# Patient Record
Sex: FEMALE | Race: WHITE | NOT HISPANIC OR LATINO | Employment: FULL TIME | URBAN - METROPOLITAN AREA
[De-identification: names, ages, dates, MRNs, and addresses within clinical notes are randomized per-mention and may not be internally consistent; named-entity substitution may affect disease eponyms.]

---

## 2023-04-06 LAB
ALANINE AMINOTRANSFERASE (SGPT) (U/L) IN SER/PLAS: 21 U/L (ref 7–45)
ALBUMIN (G/DL) IN SER/PLAS: 4.4 G/DL (ref 3.4–5)
ALKALINE PHOSPHATASE (U/L) IN SER/PLAS: 72 U/L (ref 33–110)
ANION GAP IN SER/PLAS: 14 MMOL/L (ref 10–20)
ASPARTATE AMINOTRANSFERASE (SGOT) (U/L) IN SER/PLAS: 20 U/L (ref 9–39)
BILIRUBIN TOTAL (MG/DL) IN SER/PLAS: 0.6 MG/DL (ref 0–1.2)
CALCIDIOL (25 OH VITAMIN D3) (NG/ML) IN SER/PLAS: 13 NG/ML
CALCIUM (MG/DL) IN SER/PLAS: 9.5 MG/DL (ref 8.6–10.6)
CARBON DIOXIDE, TOTAL (MMOL/L) IN SER/PLAS: 25 MMOL/L (ref 21–32)
CHLORIDE (MMOL/L) IN SER/PLAS: 108 MMOL/L (ref 98–107)
CHOLESTEROL (MG/DL) IN SER/PLAS: 188 MG/DL (ref 0–199)
CHOLESTEROL IN HDL (MG/DL) IN SER/PLAS: 49.9 MG/DL
CHOLESTEROL/HDL RATIO: 3.8
CREATININE (MG/DL) IN SER/PLAS: 0.98 MG/DL (ref 0.5–1.05)
ERYTHROCYTE DISTRIBUTION WIDTH (RATIO) BY AUTOMATED COUNT: 14.8 % (ref 11.5–14.5)
ERYTHROCYTE MEAN CORPUSCULAR HEMOGLOBIN CONCENTRATION (G/DL) BY AUTOMATED: 33.4 G/DL (ref 32–36)
ERYTHROCYTE MEAN CORPUSCULAR VOLUME (FL) BY AUTOMATED COUNT: 90 FL (ref 80–100)
ERYTHROCYTES (10*6/UL) IN BLOOD BY AUTOMATED COUNT: 4.56 X10E12/L (ref 4–5.2)
ESTIMATED AVERAGE GLUCOSE FOR HBA1C: 100 MG/DL
GFR FEMALE: 83 ML/MIN/1.73M2
GLUCOSE (MG/DL) IN SER/PLAS: 88 MG/DL (ref 74–99)
HEMATOCRIT (%) IN BLOOD BY AUTOMATED COUNT: 41 % (ref 36–46)
HEMOGLOBIN (G/DL) IN BLOOD: 13.7 G/DL (ref 12–16)
HEMOGLOBIN A1C/HEMOGLOBIN TOTAL IN BLOOD: 5.1 %
LDL: 116 MG/DL (ref 0–119)
LEUKOCYTES (10*3/UL) IN BLOOD BY AUTOMATED COUNT: 5.1 X10E9/L (ref 4.4–11.3)
NRBC (PER 100 WBCS) BY AUTOMATED COUNT: 0 /100 WBC (ref 0–0)
PLATELETS (10*3/UL) IN BLOOD AUTOMATED COUNT: 248 X10E9/L (ref 150–450)
POTASSIUM (MMOL/L) IN SER/PLAS: 4.6 MMOL/L (ref 3.5–5.3)
PROTEIN TOTAL: 7 G/DL (ref 6.4–8.2)
SODIUM (MMOL/L) IN SER/PLAS: 142 MMOL/L (ref 136–145)
THYROTROPIN (MIU/L) IN SER/PLAS BY DETECTION LIMIT <= 0.05 MIU/L: 1.13 MIU/L (ref 0.44–3.98)
TRIGLYCERIDE (MG/DL) IN SER/PLAS: 110 MG/DL (ref 0–149)
UREA NITROGEN (MG/DL) IN SER/PLAS: 14 MG/DL (ref 6–23)
VLDL: 22 MG/DL (ref 0–40)

## 2023-04-11 LAB
6-ACETYLMORPHINE: <25 NG/ML
7-AMINOCLONAZEPAM: <25 NG/ML
ALPHA-HYDROXYALPRAZOLAM: <25 NG/ML
ALPHA-HYDROXYMIDAZOLAM: <25 NG/ML
ALPRAZOLAM: <25 NG/ML
AMPHETAMINE (PRESENCE) IN URINE BY SCREEN METHOD: NORMAL
BARBITURATES PRESENCE IN URINE BY SCREEN METHOD: NORMAL
CANNABINOIDS IN URINE BY SCREEN METHOD: NORMAL
CHLORDIAZEPOXIDE: <25 NG/ML
CLONAZEPAM: <25 NG/ML
COCAINE (PRESENCE) IN URINE BY SCREEN METHOD: NORMAL
CODEINE: <50 NG/ML
CREATINE, URINE FOR DRUG: 347.1 MG/DL
DIAZEPAM: <25 NG/ML
DRUG SCREEN COMMENT URINE: NORMAL
EDDP: <25 NG/ML
FENTANYL CONFIRMATION, URINE: <2.5 NG/ML
HYDROCODONE: <25 NG/ML
HYDROMORPHONE: <25 NG/ML
LORAZEPAM: <25 NG/ML
METHADONE CONFIRMATION,URINE: <25 NG/ML
MIDAZOLAM: <25 NG/ML
MORPHINE URINE: <50 NG/ML
NORDIAZEPAM: <25 NG/ML
NORFENTANYL: <2.5 NG/ML
NORHYDROCODONE: <25 NG/ML
NOROXYCODONE: <25 NG/ML
O-DESMETHYLTRAMADOL: <50 NG/ML
OXAZEPAM: <25 NG/ML
OXYCODONE: <25 NG/ML
OXYMORPHONE: <25 NG/ML
PHENCYCLIDINE (PRESENCE) IN URINE BY SCREEN METHOD: NORMAL
TEMAZEPAM: <25 NG/ML
TRAMADOL: <50 NG/ML
ZOLPIDEM METABOLITE (ZCA): <25 NG/ML
ZOLPIDEM: <25 NG/ML

## 2023-09-20 PROBLEM — O13.9 PREGNANCY INDUCED HYPERTENSION (HHS-HCC): Status: ACTIVE | Noted: 2023-09-20

## 2023-09-20 PROBLEM — O99.210 OBESITY AFFECTING PREGNANCY, ANTEPARTUM (HHS-HCC): Status: ACTIVE | Noted: 2023-09-20

## 2023-09-20 PROBLEM — Z91.89 AT RISK FOR BLEEDING: Status: ACTIVE | Noted: 2023-09-20

## 2023-09-20 PROBLEM — N92.0 EXCESSIVE OR FREQUENT MENSTRUATION: Status: ACTIVE | Noted: 2023-09-20

## 2023-09-20 PROBLEM — N97.0 ANOVULATION: Status: ACTIVE | Noted: 2023-09-20

## 2023-09-20 PROBLEM — N94.6 DYSMENORRHEA: Status: ACTIVE | Noted: 2023-09-20

## 2023-09-20 PROBLEM — N94.10 DYSPAREUNIA IN FEMALE: Status: ACTIVE | Noted: 2023-09-20

## 2023-09-20 PROBLEM — R07.9 CHEST PAIN: Status: ACTIVE | Noted: 2023-09-20

## 2023-09-20 PROBLEM — N92.1 BREAKTHROUGH BLEEDING: Status: ACTIVE | Noted: 2023-09-20

## 2023-09-20 PROBLEM — O13.3 GESTATIONAL HYPERTENSION, THIRD TRIMESTER (HHS-HCC): Status: ACTIVE | Noted: 2023-09-20

## 2023-09-20 PROBLEM — N91.2 ABSENCE OF MENSTRUATION: Status: ACTIVE | Noted: 2023-09-20

## 2023-09-20 PROBLEM — N92.6 IRREGULAR MENSTRUAL CYCLE: Status: ACTIVE | Noted: 2023-09-20

## 2023-09-20 PROBLEM — K20.0 EOSINOPHILIC ESOPHAGITIS: Status: ACTIVE | Noted: 2023-09-20

## 2023-09-20 PROBLEM — D69.6 THROMBOCYTOPENIA, UNSPECIFIED (CMS-HCC): Status: ACTIVE | Noted: 2023-09-20

## 2023-09-20 RX ORDER — VENLAFAXINE HYDROCHLORIDE 150 MG/1
150 CAPSULE, EXTENDED RELEASE ORAL DAILY
COMMUNITY
End: 2023-10-24 | Stop reason: WASHOUT

## 2023-09-20 RX ORDER — FAMOTIDINE 20 MG/1
20 TABLET, FILM COATED ORAL 2 TIMES DAILY
COMMUNITY
End: 2023-10-24 | Stop reason: WASHOUT

## 2023-09-20 RX ORDER — OMEPRAZOLE 40 MG/1
40 CAPSULE, DELAYED RELEASE ORAL
COMMUNITY
Start: 2014-11-03 | End: 2023-10-24 | Stop reason: WASHOUT

## 2023-09-20 RX ORDER — CETIRIZINE HYDROCHLORIDE 10 MG/1
1 TABLET ORAL DAILY
COMMUNITY

## 2023-09-28 ENCOUNTER — HOSPITAL ENCOUNTER (OUTPATIENT)
Dept: DATA CONVERSION | Facility: HOSPITAL | Age: 23
Discharge: HOME | End: 2023-09-28
Payer: COMMERCIAL

## 2023-09-28 DIAGNOSIS — Z34.81 ENCOUNTER FOR SUPERVISION OF OTHER NORMAL PREGNANCY, FIRST TRIMESTER (HHS-HCC): ICD-10-CM

## 2023-09-28 LAB
DEPRECATED RDW RBC AUTO: 47.1 FL (ref 37–54)
ERYTHROCYTE [DISTWIDTH] IN BLOOD BY AUTOMATED COUNT: 13.9 % (ref 11.7–15)
GLUCOSE 1H P MEAL SERPL-MCNC: 143 MG/DL (ref 70–139)
HCT VFR BLD AUTO: 37.6 % (ref 36–44)
HGB BLD-MCNC: 11.8 GM/DL (ref 12–15)
MCH RBC QN AUTO: 29 PG (ref 26–34)
MCHC RBC AUTO-ENTMCNC: 31.4 % (ref 31–37)
MCV RBC AUTO: 92.4 FL (ref 80–100)
NRBC BLD-RTO: 0 /100 WBC
PLATELET # BLD AUTO: 167 K/UL (ref 150–450)
PMV BLD AUTO: 12.5 CU (ref 7–12.6)
RBC # BLD AUTO: 4.07 M/UL (ref 4–4.9)
WBC # BLD AUTO: 10.4 K/UL (ref 4.5–11)

## 2023-10-17 ENCOUNTER — LAB (OUTPATIENT)
Dept: LAB | Facility: LAB | Age: 23
End: 2023-10-17
Payer: COMMERCIAL

## 2023-10-17 DIAGNOSIS — O99.810 ABNORMAL GLUCOSE COMPLICATING PREGNANCY (HHS-HCC): Primary | ICD-10-CM

## 2023-10-17 PROCEDURE — 82950 GLUCOSE TEST: CPT | Mod: CMCLAB,WESLAB | Performed by: OBSTETRICS & GYNECOLOGY

## 2023-10-17 PROCEDURE — 82947 ASSAY GLUCOSE BLOOD QUANT: CPT | Mod: CMCLAB,WESLAB | Performed by: OBSTETRICS & GYNECOLOGY

## 2023-10-17 PROCEDURE — 36415 COLL VENOUS BLD VENIPUNCTURE: CPT

## 2023-10-18 LAB
GLUCOSE 1H P 100 G GLC PO SERPL-MCNC: 191 MG/DL
GLUCOSE 2H P 100 G GLC PO SERPL-MCNC: 125 MG/DL
GLUCOSE P FAST SERPL-MCNC: 92 MG/DL

## 2023-10-23 NOTE — PROGRESS NOTES
Subjective   Patient ID [id number]   Margot Nichols is a 22 y.o. No obstetric history on file. at Unknown with a working estimated date of delivery of Not found. who presents for a routine prenatal visit. She denies vaginal bleeding, leakage of fluid, decreased fetal movements, or contractions.    Her pregnancy is complicated by:  Abnormal 1 hour    Objective   Physical Exam:   Weight: 122 kg (268 lb)  Expected Total Weight Gain: Could not be calculated   Pregravid BMI: Could not be calculated  BP: 120/78  Fetal Heart Rate: 155 Fundal Height (cm): 30 cm Presentation: Vertex           Prenatal Labs  Urine Dip  Results for orders placed or performed in visit on 10/24/23   POCT UA Automated manually resulted   Result Value Ref Range    POC Color, Urine Yellow Straw, Yellow, Light-Yellow    POC Appearance, Urine Clear Clear    POC Specific Gravity, Urine 1.025 1.005 - 1.035    POC PH, Urine 7.0 No Reference Range Established PH    POC Protein, Urine TRACE (A) NEGATIVE, 30 (1+) mg/dl    POC Glucose, Urine NEGATIVE NEGATIVE mg/dl    POC Blood, Urine NEGATIVE NEGATIVE    POC Ketones, Urine NEGATIVE NEGATIVE mg/dl    POC Bilirubin, Urine NEGATIVE NEGATIVE    POC Urobilinogen, Urine 0.2 0.2, 1.0 EU/DL    Poc Nitrate, Urine NEGATIVE NEGATIVE    POC Leukocytes, Urine NEGATIVE NEGATIVE         Imaging  Scheduled 11/3/2023    Assessment/Plan   Diagnoses and all orders for this visit:  Prenatal care, subsequent pregnancy, third trimester  -     POCT UA Automated manually resulted  -     Tdap vaccine, age 7 years and older  28 weeks gestation of pregnancy    Continue prenatal vitamin.  Labs reviewed.  TDAP GIVEN  Follow up in 1 week for a routine prenatal visit.

## 2023-10-24 ENCOUNTER — ROUTINE PRENATAL (OUTPATIENT)
Dept: OBSTETRICS AND GYNECOLOGY | Facility: CLINIC | Age: 23
End: 2023-10-24
Payer: COMMERCIAL

## 2023-10-24 VITALS — SYSTOLIC BLOOD PRESSURE: 120 MMHG | DIASTOLIC BLOOD PRESSURE: 78 MMHG | WEIGHT: 268 LBS | BODY MASS INDEX: 44.6 KG/M2

## 2023-10-24 DIAGNOSIS — Z3A.28 28 WEEKS GESTATION OF PREGNANCY (HHS-HCC): ICD-10-CM

## 2023-10-24 DIAGNOSIS — Z34.83 PRENATAL CARE, SUBSEQUENT PREGNANCY, THIRD TRIMESTER (HHS-HCC): Primary | ICD-10-CM

## 2023-10-24 LAB
POC APPEARANCE, URINE: CLEAR
POC BILIRUBIN, URINE: NEGATIVE
POC BLOOD, URINE: NEGATIVE
POC COLOR, URINE: YELLOW
POC GLUCOSE, URINE: NEGATIVE MG/DL
POC KETONES, URINE: NEGATIVE MG/DL
POC LEUKOCYTES, URINE: NEGATIVE
POC NITRITE,URINE: NEGATIVE
POC PH, URINE: 7 PH
POC PROTEIN, URINE: ABNORMAL MG/DL
POC SPECIFIC GRAVITY, URINE: 1.02
POC UROBILINOGEN, URINE: 0.2 EU/DL

## 2023-10-24 PROCEDURE — 81003 URINALYSIS AUTO W/O SCOPE: CPT | Mod: QW | Performed by: OBSTETRICS & GYNECOLOGY

## 2023-10-24 PROCEDURE — 0501F PRENATAL FLOW SHEET: CPT | Performed by: OBSTETRICS & GYNECOLOGY

## 2023-10-24 PROCEDURE — 90471 IMMUNIZATION ADMIN: CPT | Performed by: OBSTETRICS & GYNECOLOGY

## 2023-10-24 PROCEDURE — 99213 OFFICE O/P EST LOW 20 MIN: CPT | Performed by: OBSTETRICS & GYNECOLOGY

## 2023-10-24 PROCEDURE — 90715 TDAP VACCINE 7 YRS/> IM: CPT | Performed by: OBSTETRICS & GYNECOLOGY

## 2023-10-24 RX ORDER — BUPROPION HYDROCHLORIDE 300 MG/1
300 TABLET ORAL EVERY MORNING
COMMUNITY
Start: 2023-08-14

## 2023-10-24 RX ORDER — PANTOPRAZOLE SODIUM 40 MG/1
40 TABLET, DELAYED RELEASE ORAL
COMMUNITY
End: 2023-12-04

## 2023-10-24 ASSESSMENT — LIFESTYLE VARIABLES
AUDIT-C TOTAL SCORE: 0
HOW OFTEN DO YOU HAVE SIX OR MORE DRINKS ON ONE OCCASION: NEVER
SKIP TO QUESTIONS 9-10: 1
HOW MANY STANDARD DRINKS CONTAINING ALCOHOL DO YOU HAVE ON A TYPICAL DAY: PATIENT DOES NOT DRINK
HOW OFTEN DO YOU HAVE A DRINK CONTAINING ALCOHOL: NEVER

## 2023-10-24 ASSESSMENT — PATIENT HEALTH QUESTIONNAIRE - PHQ9
2. FEELING DOWN, DEPRESSED OR HOPELESS: NOT AT ALL
1. LITTLE INTEREST OR PLEASURE IN DOING THINGS: NOT AT ALL
SUM OF ALL RESPONSES TO PHQ9 QUESTIONS 1 & 2: 0

## 2023-10-30 ENCOUNTER — TELEPHONE (OUTPATIENT)
Dept: OBSTETRICS AND GYNECOLOGY | Facility: CLINIC | Age: 23
End: 2023-10-30
Payer: COMMERCIAL

## 2023-10-30 NOTE — TELEPHONE ENCOUNTER
29 weeks pregnant.  Pt calling with update after resting on (L) side:  she states her pain under her ribs resolved and is still slightly sob.  Pt states she now has a headache and vision changes.  Vision changes have been off and on for the past hour, no improvement in headache with tylenol.  Per Dr. Maurer, pt advised to continue resting, increase fluids, tylenol prn, and follow up in office tomorrow morning for bp check.  Pt advised if sx worsen needs to go to ED.

## 2023-10-30 NOTE — TELEPHONE ENCOUNTER
29 weeks pregnant.  Pt c/o SOB, nausea, and sharp pain under her rib cage for the past hour.  Pt states she pain under her rib cage in constant.  Pt advised to take tylenol, lay on her left side to see if she can get baby to move and if sx improve.  Pt advised to call office back if sx worsen or do not improve

## 2023-10-31 ENCOUNTER — APPOINTMENT (OUTPATIENT)
Dept: OBSTETRICS AND GYNECOLOGY | Facility: CLINIC | Age: 23
End: 2023-10-31
Payer: COMMERCIAL

## 2023-10-31 ENCOUNTER — TELEPHONE (OUTPATIENT)
Dept: OBSTETRICS AND GYNECOLOGY | Facility: CLINIC | Age: 23
End: 2023-10-31
Payer: COMMERCIAL

## 2023-10-31 NOTE — TELEPHONE ENCOUNTER
Pt missed her appt today for bp check due to a work emergency.  Pt states she started to feel better last night and her headache/vision changes have resolved. Pt's next appt in office is 11/06

## 2023-11-03 NOTE — PROGRESS NOTES
Subjective   Patient ID [id number]   Margot Nichols is a 22 y.o.  at 30w2d with a working estimated date of delivery of 1/10/2024, by Ultrasound who presents for a routine prenatal visit. She denies vaginal bleeding, leakage of fluid, decreased fetal movements, or contractions.    Her pregnancy is complicated by:  Depression-on Wellbutrin  IBS- stable  BMI > 40- ultrasound 30 & 36 weeks  Weekly NST @ 34 weeks  Abnormal 1 hour-lab lost 3 hour specimen but otherwise normal  U/S-breech>90%ile    Objective   Physical Exam:   Weight: 123 kg (272 lb 3.2 oz)  Expected Total Weight Gain: Could not be calculated   Pregravid BMI: Could not be calculated  BP: 126/90  Fetal Heart Rate: 135 Fundal Height (cm): 32 cm Presentation: Vertex           Prenatal Labs  Urine Dip  Results for orders placed or performed in visit on 23   POCT UA Automated manually resulted   Result Value Ref Range    POC Color, Urine Yellow Straw, Yellow, Light-Yellow    POC Appearance, Urine Clear Clear    POC Specific Gravity, Urine >=1.030 1.005 - 1.035    POC PH, Urine 7.0 No Reference Range Established PH    POC Protein, Urine TRACE (A) NEGATIVE, 30 (1+) mg/dl    POC Glucose, Urine NEGATIVE NEGATIVE mg/dl    POC Blood, Urine NEGATIVE NEGATIVE    POC Ketones, Urine 15 (1+) (A) NEGATIVE mg/dl    POC Bilirubin, Urine NEGATIVE NEGATIVE    POC Urobilinogen, Urine 0.2 0.2, 1.0 EU/DL    Poc Nitrate, Urine NEGATIVE NEGATIVE    POC Leukocytes, Urine SMALL (1+) (A) NEGATIVE         Imaging  Will schedule at 36 weeks    Assessment/Plan   Diagnoses and all orders for this visit:  Excessive fetal growth affecting management of pregnancy in third trimester, fetus 1 of multiple gestation  -     US OB limited 1+ fetuses; Future  30 weeks gestation of pregnancy  -     POCT UA Automated manually resulted    Continue prenatal vitamin.    Follow up in 1 week for a routine prenatal visit.

## 2023-11-06 ENCOUNTER — ROUTINE PRENATAL (OUTPATIENT)
Dept: OBSTETRICS AND GYNECOLOGY | Facility: CLINIC | Age: 23
End: 2023-11-06
Payer: COMMERCIAL

## 2023-11-06 VITALS — WEIGHT: 272.2 LBS | DIASTOLIC BLOOD PRESSURE: 90 MMHG | BODY MASS INDEX: 45.3 KG/M2 | SYSTOLIC BLOOD PRESSURE: 126 MMHG

## 2023-11-06 DIAGNOSIS — Z3A.30 30 WEEKS GESTATION OF PREGNANCY (HHS-HCC): ICD-10-CM

## 2023-11-06 DIAGNOSIS — O36.63X1 EXCESSIVE FETAL GROWTH AFFECTING MANAGEMENT OF PREGNANCY IN THIRD TRIMESTER, FETUS 1 OF MULTIPLE GESTATION (HHS-HCC): Primary | ICD-10-CM

## 2023-11-06 LAB
POC APPEARANCE, URINE: CLEAR
POC BILIRUBIN, URINE: NEGATIVE
POC BLOOD, URINE: NEGATIVE
POC COLOR, URINE: YELLOW
POC GLUCOSE, URINE: NEGATIVE MG/DL
POC KETONES, URINE: ABNORMAL MG/DL
POC LEUKOCYTES, URINE: ABNORMAL
POC NITRITE,URINE: NEGATIVE
POC PH, URINE: 7 PH
POC PROTEIN, URINE: ABNORMAL MG/DL
POC SPECIFIC GRAVITY, URINE: >=1.03
POC UROBILINOGEN, URINE: 0.2 EU/DL

## 2023-11-06 PROCEDURE — 81003 URINALYSIS AUTO W/O SCOPE: CPT | Performed by: OBSTETRICS & GYNECOLOGY

## 2023-11-06 PROCEDURE — 0501F PRENATAL FLOW SHEET: CPT | Performed by: OBSTETRICS & GYNECOLOGY

## 2023-11-06 ASSESSMENT — LIFESTYLE VARIABLES
SKIP TO QUESTIONS 9-10: 1
HOW OFTEN DO YOU HAVE SIX OR MORE DRINKS ON ONE OCCASION: NEVER
HOW MANY STANDARD DRINKS CONTAINING ALCOHOL DO YOU HAVE ON A TYPICAL DAY: PATIENT DOES NOT DRINK
AUDIT-C TOTAL SCORE: 0
HOW OFTEN DO YOU HAVE A DRINK CONTAINING ALCOHOL: NEVER

## 2023-11-06 ASSESSMENT — PATIENT HEALTH QUESTIONNAIRE - PHQ9
1. LITTLE INTEREST OR PLEASURE IN DOING THINGS: NOT AT ALL
2. FEELING DOWN, DEPRESSED OR HOPELESS: NOT AT ALL
SUM OF ALL RESPONSES TO PHQ9 QUESTIONS 1 & 2: 0

## 2023-11-17 ENCOUNTER — HOSPITAL ENCOUNTER (OUTPATIENT)
Facility: HOSPITAL | Age: 23
Discharge: HOME | End: 2023-11-17
Attending: OBSTETRICS & GYNECOLOGY | Admitting: OBSTETRICS & GYNECOLOGY
Payer: COMMERCIAL

## 2023-11-17 ENCOUNTER — TELEPHONE (OUTPATIENT)
Dept: OBSTETRICS AND GYNECOLOGY | Facility: CLINIC | Age: 23
End: 2023-11-17
Payer: COMMERCIAL

## 2023-11-17 VITALS
SYSTOLIC BLOOD PRESSURE: 117 MMHG | TEMPERATURE: 98.1 F | OXYGEN SATURATION: 97 % | RESPIRATION RATE: 18 BRPM | DIASTOLIC BLOOD PRESSURE: 72 MMHG | HEART RATE: 105 BPM

## 2023-11-17 LAB
BILIRUBIN, POC: NEGATIVE
BLOOD URINE, POC: NEGATIVE
CLARITY, POC: CLEAR
COLOR, POC: ABNORMAL
GLUCOSE URINE, POC: ABNORMAL
KETONES, POC: POSITIVE
LEUKOCYTE EST, POC: NEGATIVE
NITRITE, POC: NEGATIVE
PH, POC: 5
POC APPEARANCE OF BODY FLUID: ABNORMAL
POC APPEARANCE, URINE: CLEAR
POC BILIRUBIN, URINE: NEGATIVE
POC BLOOD, URINE: NEGATIVE
POC COLOR, URINE: YELLOW
POC GLUCOSE, URINE: NEGATIVE MG/DL
POC KETONES, URINE: ABNORMAL MG/DL
POC LEUKOCYTES, URINE: ABNORMAL
POC NITRITE,URINE: NEGATIVE
POC PH, URINE: 6.5 PH
POC PROTEIN, URINE: ABNORMAL MG/DL
POC SPECIFIC GRAVITY, URINE: >=1.03
POC UROBILINOGEN, URINE: 0.2 EU/DL
SPECIFIC GRAVITY, POC: 1.02
URINE PROTEIN, POC: ABNORMAL
UROBILINOGEN, POC: NORMAL

## 2023-11-17 PROCEDURE — 81002 URINALYSIS NONAUTO W/O SCOPE: CPT | Performed by: OBSTETRICS & GYNECOLOGY

## 2023-11-17 ASSESSMENT — PAIN SCALES - GENERAL: PAINLEVEL_OUTOF10: 3

## 2023-11-17 NOTE — DISCHARGE SUMMARY
Discharge Summary    Admission Date: 11/17/2023  Discharge Date: 11/17/23    Discharge Diagnosis  Normal pregnancy symptoms    Hospital Course  Seen in Triage     Procedures:  NST      Pertinent Physical Exam At Time of Discharge  See Triage note    Discharge Meds     Your medication list        CONTINUE taking these medications        Instructions Last Dose Given Next Dose Due   buPROPion  mg 24 hr tablet  Commonly known as: Wellbutrin XL           pantoprazole 40 mg EC tablet  Commonly known as: ProtoNix           PRENATAL + DHA ORAL           ZyrTEC 10 mg tablet  Generic drug: cetirizine                     Complications Requiring Follow-Up  none    Test Results Pending At Discharge  Pending Labs       No current pending labs.            Outpatient Follow-Up  Future Appointments   Date Time Provider Department Center   11/20/2023 10:45 AM MD Yahaira Lindsay   12/6/2023  8:45 AM MD Yahaira Lindsay   12/14/2023  3:15 PM [facility]   12/18/2023  8:45 AM MD Yahaira Lindsay   12/26/2023  8:45 AM MD Yahaira Lund   1/3/2024  8:45 AM MD Yahaira Lindsay   1/8/2024  8:45 AM [facility]       I spent 20 minutes in the professional and overall care of this patient.      Anne Marie Abebe MD

## 2023-11-17 NOTE — TELEPHONE ENCOUNTER
Est ob 32 wks. Pt states she has bilat leg and ankle swelling with pain. Feels slight lightheaded/ short of breath. Swelling started this morning. No vision changes or headaches. Had BP issues with first pregnancy. No way of checking BP while at work. BP last night at home was 136/89

## 2023-11-17 NOTE — H&P
Obstetrical Admission History and Physical     Margot Nichols is a 22 y.o.  at 32w2d presents with vague complaint of headache, swelling in legs, not feeling great.  Concerned as she had some high blood pressures last pregancy    Chief Complaint: Extremity Swelling        Active Problems:  There are no active Hospital Problems.      Pregnancy Problems (from 10/24/23 to present)       No problems associated with this episode.          Subjective   Margot is here complaining of above. Good fetal movement. Denies vaginal bleeding., Denies contractions., Denies leaking of fluid.           Obstetrical History   OB History    Para Term  AB Living   2 1 1   0 1   SAB IAB Ectopic Multiple Live Births   0 0 0 0 1      # Outcome Date GA Lbr Vikas/2nd Weight Sex Delivery Anes PTL Lv   2 Current            1 Term    3317 g M Vag-Spont   YOSHI       Past Medical History  Past Medical History:   Diagnosis Date    Other conditions influencing health status     Full-term infant    Personal history of other diseases of the digestive system 12/15/2014    History of gastroesophageal reflux (GERD)    Personal history of other specified conditions 12/15/2014    History of vomiting        Past Surgical History   Past Surgical History:   Procedure Laterality Date    MYRINGOTOMY W/ TUBES  2014    Myringotomy - With Ventilating Tube Insertion    OTHER SURGICAL HISTORY  2021    Endoscopy       Social History  Social History     Tobacco Use    Smoking status: Never    Smokeless tobacco: Never   Substance Use Topics    Alcohol use: Never     Substance and Sexual Activity   Drug Use Never       Allergies  Patient has no known allergies.     Medications  Medications Prior to Admission   Medication Sig Dispense Refill Last Dose    buPROPion XL (Wellbutrin XL) 300 mg 24 hr tablet Take 1 tablet (300 mg) by mouth once daily in the morning.       cetirizine (ZyrTEC) 10 mg tablet Take 1 tablet (10 mg) by mouth once  daily.       pantoprazole (ProtoNix) 40 mg EC tablet Take 1 tablet (40 mg) by mouth once daily in the morning. Take before meals.       prenatal vit 91/iron/folic/dha (PRENATAL + DHA ORAL) Prenatal + DHA          Objective    Last Vitals  Temp Pulse Resp BP MAP O2 Sat   36.7 °C (98.1 °F) 105 18 117/72   97 %     Physical Examination  GENERAL: Examination reveals a well developed, well nourished and overweight, gravid female in no acute distress. She is alert and cooperative.  ABDOMEN: soft, gravid, nontender, nondistended, no abnormal masses, no epigastric pain  FHR is baseline 150 , with Accelerations, and a cat 1 tracing.    [place] reading: no cxns  The fetus is in a vertex presentation, determined by Leopold's maneuver  EXTREMITIES: no redness or tenderness in the calves or thighs, edema 1+ bilateral LE  SKIN: normal coloration and turgor, no rashes  NEUROLOGICAL: DTRs normal and symmetrical  PSYCHOLOGICAL: awake and alert; oriented to person, place, and time    Lab Review     Component 16:05   Color, UA Dia   Clarity, UA Clear   Glucose,  mg/dl   Bilirubin, UA NEGATIVE   Ketones, UA Positive   Spec Grav, UA 1.025   Blood, UA Negative   pH, UA 5.0   Protein, UA TRACE   Urobilinogen, UA Normal   Leukocytes, UA NEGATIVE   Nitrite, UA NEGATIVE   Appearance, Fluid Hazy                     Assessment/Plan   Normal pregnancy symptoms, no evidence of PET  Reassurance given to the patient  Fluids and electrolyte beverages encouraged  Follow up Mon for reg scheduled appt    Anne Marie Abebe MD

## 2023-11-20 ENCOUNTER — ROUTINE PRENATAL (OUTPATIENT)
Dept: OBSTETRICS AND GYNECOLOGY | Facility: CLINIC | Age: 23
End: 2023-11-20
Payer: COMMERCIAL

## 2023-11-20 VITALS — BODY MASS INDEX: 45.93 KG/M2 | DIASTOLIC BLOOD PRESSURE: 70 MMHG | WEIGHT: 276 LBS | SYSTOLIC BLOOD PRESSURE: 122 MMHG

## 2023-11-20 DIAGNOSIS — Z3A.32 32 WEEKS GESTATION OF PREGNANCY (HHS-HCC): ICD-10-CM

## 2023-11-20 DIAGNOSIS — Z34.83 PRENATAL CARE, SUBSEQUENT PREGNANCY, THIRD TRIMESTER (HHS-HCC): Primary | ICD-10-CM

## 2023-11-20 PROCEDURE — 0501F PRENATAL FLOW SHEET: CPT | Performed by: OBSTETRICS & GYNECOLOGY

## 2023-11-20 PROCEDURE — 81003 URINALYSIS AUTO W/O SCOPE: CPT | Performed by: OBSTETRICS & GYNECOLOGY

## 2023-11-20 ASSESSMENT — LIFESTYLE VARIABLES
HOW MANY STANDARD DRINKS CONTAINING ALCOHOL DO YOU HAVE ON A TYPICAL DAY: PATIENT DOES NOT DRINK
HOW OFTEN DO YOU HAVE A DRINK CONTAINING ALCOHOL: NEVER

## 2023-11-20 NOTE — PROGRESS NOTES
Subjective   Patient ID [id number]   Margot Nichols is a 22 y.o.  at 32w5d with a working estimated date of delivery of 1/10/2024, by Ultrasound who presents for a routine prenatal visit. She denies vaginal bleeding, leakage of fluid, decreased fetal movements, or contractions.    Her pregnancy is complicated by:  2023    Objective   Physical Exam:   Weight: 125 kg (276 lb)    Pregravid BMI: 41.77      BP: 122/70  Fetal Heart Rate: 155 Fundal Height (cm): 35 cm             Prenatal Labs  Urine Dip  Results for orders placed or performed in visit on 23   POCT UA Automated manually resulted   Result Value Ref Range    POC Color, Urine Yellow Straw, Yellow, Light-Yellow    POC Appearance, Urine Clear Clear    POC Glucose, Urine NEGATIVE NEGATIVE mg/dl    POC Bilirubin, Urine NEGATIVE NEGATIVE    POC Ketones, Urine TRACE (A) NEGATIVE mg/dl    POC Specific Gravity, Urine >=1.030 1.005 - 1.035    POC Blood, Urine NEGATIVE NEGATIVE    POC PH, Urine 6.5 No Reference Range Established PH    POC Protein, Urine 30 (1+) NEGATIVE, 30 (1+) mg/dl    POC Urobilinogen, Urine 0.2 0.2, 1.0 EU/DL    Poc Nitrite, Urine NEGATIVE NEGATIVE    POC Leukocytes, Urine SMALL (1+) (A) NEGATIVE             Assessment/Plan   Diagnoses and all orders for this visit:  Prenatal care, subsequent pregnancy, third trimester  32 weeks gestation of pregnancy  -     POCT UA Automated manually resulted    Continue prenatal vitamin.  Follow up in 1 week for a routine prenatal visit.

## 2023-11-27 ENCOUNTER — LAB (OUTPATIENT)
Dept: LAB | Facility: LAB | Age: 23
End: 2023-11-27
Payer: COMMERCIAL

## 2023-11-27 ENCOUNTER — TELEPHONE (OUTPATIENT)
Dept: OBSTETRICS AND GYNECOLOGY | Facility: CLINIC | Age: 23
End: 2023-11-27
Payer: COMMERCIAL

## 2023-11-27 DIAGNOSIS — O13.3 GESTATIONAL HYPERTENSION, THIRD TRIMESTER (HHS-HCC): Primary | ICD-10-CM

## 2023-11-27 DIAGNOSIS — O13.3 GESTATIONAL HYPERTENSION, THIRD TRIMESTER (HHS-HCC): ICD-10-CM

## 2023-11-27 LAB
ALT SERPL-CCNC: 14 U/L (ref 5–40)
AST SERPL-CCNC: 15 U/L (ref 5–40)
CREAT UR-MCNC: 232.3 MG/DL
ERYTHROCYTE [DISTWIDTH] IN BLOOD BY AUTOMATED COUNT: 14.1 % (ref 11.5–14.5)
HCT VFR BLD AUTO: 36.6 % (ref 36–46)
HGB BLD-MCNC: 11.3 G/DL (ref 12–16)
LDH SERPL-CCNC: 195 U/L (ref 91–227)
MCH RBC QN AUTO: 26.9 PG (ref 26–34)
MCHC RBC AUTO-ENTMCNC: 30.9 G/DL (ref 32–36)
MCV RBC AUTO: 87 FL (ref 80–100)
NRBC BLD-RTO: 0 /100 WBCS (ref 0–0)
PLATELET # BLD AUTO: 177 X10*3/UL (ref 150–450)
PROT UR-MCNC: 46 MG/DL
PROT/CREAT UR: 0.2 MG/MG CREAT
RBC # BLD AUTO: 4.2 X10*6/UL (ref 4–5.2)
URATE SERPL-MCNC: 3.6 MG/DL (ref 2.5–6.8)
WBC # BLD AUTO: 8.8 X10*3/UL (ref 4.4–11.3)

## 2023-11-27 PROCEDURE — 83615 LACTATE (LD) (LDH) ENZYME: CPT

## 2023-11-27 PROCEDURE — 85027 COMPLETE CBC AUTOMATED: CPT

## 2023-11-27 PROCEDURE — 84550 ASSAY OF BLOOD/URIC ACID: CPT

## 2023-11-27 PROCEDURE — 84460 ALANINE AMINO (ALT) (SGPT): CPT

## 2023-11-27 PROCEDURE — 82570 ASSAY OF URINE CREATININE: CPT

## 2023-11-27 PROCEDURE — 84156 ASSAY OF PROTEIN URINE: CPT

## 2023-11-27 PROCEDURE — 84450 TRANSFERASE (AST) (SGOT): CPT

## 2023-11-27 PROCEDURE — 36415 COLL VENOUS BLD VENIPUNCTURE: CPT

## 2023-11-27 NOTE — TELEPHONE ENCOUNTER
33 weeks.  Pt was on L&D last week for monitoring of bp.  Today bp was 135/90 and 116/93.  Pt has had a headache with vision changes all day.  Pt took tylenol around 12 with no relief.  Pt has increased her fluid intake.  Per Dr. Maurer, pt to have labs drawn now

## 2023-11-30 ENCOUNTER — TELEPHONE (OUTPATIENT)
Dept: OBSTETRICS AND GYNECOLOGY | Facility: CLINIC | Age: 23
End: 2023-11-30
Payer: COMMERCIAL

## 2023-11-30 NOTE — TELEPHONE ENCOUNTER
34 weeks pregnant.  Pt with irregular contractions since last night.  Last night she monitored for about 2 hours and then went to bed, they were not painful.  This morning she monitored for the past few hours and are ranging from 4-8 hours and just feel crampy.  Advised to monitor, rest, and increase fluids.  To call if contractions become more regular.

## 2023-12-03 DIAGNOSIS — R12 HEARTBURN: Primary | ICD-10-CM

## 2023-12-04 RX ORDER — PANTOPRAZOLE SODIUM 40 MG/1
40 TABLET, DELAYED RELEASE ORAL DAILY
Qty: 90 TABLET | Refills: 0 | Status: SHIPPED | OUTPATIENT
Start: 2023-12-04

## 2023-12-05 NOTE — PROGRESS NOTES
Subjective   Patient ID [id number]   Margot Nichols is a 22 y.o.  at 34w6d with a working estimated date of delivery of 1/10/2024, by Ultrasound who presents for a routine prenatal visit. She denies vaginal bleeding, leakage of fluid, decreased fetal movements, or contractions.      Objective   Physical Exam:   Weight: 126 kg (278 lb)    Pregravid BMI: 41.77      BP: 122/76  Fetal Heart Rate: reactive   Presentation: Vertex  Dilation: Closed Effacement (%): 50 Fetal Station: -2    Prenatal Labs  Urine Dip  Results for orders placed or performed in visit on 23   POCT UA Automated manually resulted   Result Value Ref Range    POC Color, Urine Yellow Straw, Yellow, Light-Yellow    POC Appearance, Urine Clear Clear    POC Glucose, Urine NEGATIVE NEGATIVE mg/dl    POC Bilirubin, Urine NEGATIVE NEGATIVE    POC Ketones, Urine NEGATIVE NEGATIVE mg/dl    POC Specific Gravity, Urine >=1.030 1.005 - 1.035    POC Blood, Urine NEGATIVE NEGATIVE    POC PH, Urine 6.0 No Reference Range Established PH    POC Protein, Urine 30 (1+) NEGATIVE, 30 (1+) mg/dl    POC Urobilinogen, Urine 0.2 0.2, 1.0 EU/DL    Poc Nitrite, Urine NEGATIVE NEGATIVE    POC Leukocytes, Urine TRACE (A) NEGATIVE         Imaging  scheduled    Assessment/Plan   Diagnoses and all orders for this visit:  Gestational hypertension, third trimester  Prenatal care, subsequent pregnancy, third trimester  35 weeks gestation of pregnancy  -     POCT UA Automated manually resulted    Continue prenatal vitamin.  Labs reviewed.  GBS taken.  Expected mode of delivery   Follow up in 1 week for a routine prenatal visit.

## 2023-12-06 ENCOUNTER — ROUTINE PRENATAL (OUTPATIENT)
Dept: OBSTETRICS AND GYNECOLOGY | Facility: CLINIC | Age: 23
End: 2023-12-06
Payer: COMMERCIAL

## 2023-12-06 VITALS — SYSTOLIC BLOOD PRESSURE: 122 MMHG | WEIGHT: 278 LBS | BODY MASS INDEX: 46.26 KG/M2 | DIASTOLIC BLOOD PRESSURE: 76 MMHG

## 2023-12-06 DIAGNOSIS — Z34.83 PRENATAL CARE, SUBSEQUENT PREGNANCY, THIRD TRIMESTER (HHS-HCC): ICD-10-CM

## 2023-12-06 DIAGNOSIS — O13.3 GESTATIONAL HYPERTENSION, THIRD TRIMESTER (HHS-HCC): Primary | ICD-10-CM

## 2023-12-06 DIAGNOSIS — Z3A.35 35 WEEKS GESTATION OF PREGNANCY (HHS-HCC): ICD-10-CM

## 2023-12-06 LAB
POC APPEARANCE, URINE: CLEAR
POC BILIRUBIN, URINE: NEGATIVE
POC BLOOD, URINE: NEGATIVE
POC COLOR, URINE: YELLOW
POC GLUCOSE, URINE: NEGATIVE MG/DL
POC KETONES, URINE: NEGATIVE MG/DL
POC LEUKOCYTES, URINE: ABNORMAL
POC NITRITE,URINE: NEGATIVE
POC PH, URINE: 6 PH
POC PROTEIN, URINE: ABNORMAL MG/DL
POC SPECIFIC GRAVITY, URINE: >=1.03
POC UROBILINOGEN, URINE: 0.2 EU/DL

## 2023-12-06 PROCEDURE — 59025 FETAL NON-STRESS TEST: CPT | Performed by: OBSTETRICS & GYNECOLOGY

## 2023-12-06 PROCEDURE — 81003 URINALYSIS AUTO W/O SCOPE: CPT | Performed by: OBSTETRICS & GYNECOLOGY

## 2023-12-06 PROCEDURE — 87081 CULTURE SCREEN ONLY: CPT | Performed by: OBSTETRICS & GYNECOLOGY

## 2023-12-06 PROCEDURE — 0501F PRENATAL FLOW SHEET: CPT | Performed by: OBSTETRICS & GYNECOLOGY

## 2023-12-06 ASSESSMENT — ENCOUNTER SYMPTOMS
DEPRESSION: 0
OCCASIONAL FEELINGS OF UNSTEADINESS: 0
LOSS OF SENSATION IN FEET: 0

## 2023-12-06 ASSESSMENT — PATIENT HEALTH QUESTIONNAIRE - PHQ9
SUM OF ALL RESPONSES TO PHQ9 QUESTIONS 1 & 2: 0
2. FEELING DOWN, DEPRESSED OR HOPELESS: NOT AT ALL
1. LITTLE INTEREST OR PLEASURE IN DOING THINGS: NOT AT ALL

## 2023-12-06 ASSESSMENT — PAIN SCALES - GENERAL: PAINLEVEL_OUTOF10: 0 - NO PAIN

## 2023-12-06 ASSESSMENT — PAIN - FUNCTIONAL ASSESSMENT: PAIN_FUNCTIONAL_ASSESSMENT: 0-10

## 2023-12-06 ASSESSMENT — LIFESTYLE VARIABLES
HOW MANY STANDARD DRINKS CONTAINING ALCOHOL DO YOU HAVE ON A TYPICAL DAY: PATIENT DOES NOT DRINK
HOW OFTEN DO YOU HAVE A DRINK CONTAINING ALCOHOL: NEVER
SKIP TO QUESTIONS 9-10: 1
AUDIT-C TOTAL SCORE: 0
HOW OFTEN DO YOU HAVE SIX OR MORE DRINKS ON ONE OCCASION: NEVER

## 2023-12-09 NOTE — PROGRESS NOTES
Subjective   Patient ID [id number]   Margot Nichols is a 22 y.o.  at 35w3d with a working estimated date of delivery of 1/10/2024, by Ultrasound who presents for a routine prenatal visit. She denies vaginal bleeding, leakage of fluid, decreased fetal movements, or contractions.    Her pregnancy is complicated by:  Mild preeclampsia    Objective   Physical Exam:   Weight: 123 kg (272 lb)    Pregravid BMI: 40.53      BP: 128/84  Fetal Heart Rate: reactive   Presentation: Vertex  Dilation: 1 Effacement (%): 60 Fetal Station: -3    Prenatal Labs  Urine Dip  Results for orders placed or performed in visit on 23   POCT UA Automated manually resulted   Result Value Ref Range    POC Color, Urine Yellow Straw, Yellow, Light-Yellow    POC Appearance, Urine Clear Clear    POC Glucose, Urine 500 (3+) (A) NEGATIVE mg/dl    POC Bilirubin, Urine NEGATIVE NEGATIVE    POC Ketones, Urine 15 (1+) (A) NEGATIVE mg/dl    POC Specific Gravity, Urine >=1.030 1.005 - 1.035    POC Blood, Urine NEGATIVE NEGATIVE    POC PH, Urine 5.5 No Reference Range Established PH    POC Protein, Urine 30 (1+) NEGATIVE, 30 (1+) mg/dl    POC Urobilinogen, Urine 0.2 0.2, 1.0 EU/DL    Poc Nitrite, Urine NEGATIVE NEGATIVE    POC Leukocytes, Urine NEGATIVE NEGATIVE             Assessment/Plan   Diagnoses and all orders for this visit:  Gestational hypertension, third trimester  -     POCT UA Automated manually resulted  -     Fetal nonstress test  -     Labor Induction; Future  Excessive fetal growth affecting management of pregnancy in third trimester, fetus 1 of multiple gestation  36 weeks gestation of pregnancy    Continue prenatal vitamin.  Expected mode of delivery   Follow up in 1 week for a routine prenatal visit.

## 2023-12-10 LAB — GP B STREP GENITAL QL CULT: NORMAL

## 2023-12-12 ENCOUNTER — HOSPITAL ENCOUNTER (OUTPATIENT)
Facility: HOSPITAL | Age: 23
End: 2023-12-12
Attending: OBSTETRICS & GYNECOLOGY | Admitting: OBSTETRICS & GYNECOLOGY
Payer: COMMERCIAL

## 2023-12-12 ENCOUNTER — HOSPITAL ENCOUNTER (OUTPATIENT)
Facility: HOSPITAL | Age: 23
Discharge: HOME | End: 2023-12-12
Attending: OBSTETRICS & GYNECOLOGY | Admitting: OBSTETRICS & GYNECOLOGY
Payer: COMMERCIAL

## 2023-12-12 VITALS
HEIGHT: 66 IN | BODY MASS INDEX: 44.68 KG/M2 | HEART RATE: 104 BPM | OXYGEN SATURATION: 98 % | TEMPERATURE: 98.1 F | DIASTOLIC BLOOD PRESSURE: 95 MMHG | SYSTOLIC BLOOD PRESSURE: 148 MMHG | RESPIRATION RATE: 19 BRPM | WEIGHT: 278 LBS

## 2023-12-12 LAB
ALBUMIN SERPL-MCNC: 3.6 G/DL (ref 3.5–5)
ALP BLD-CCNC: 119 U/L (ref 35–125)
ALT SERPL-CCNC: 13 U/L (ref 5–40)
ANION GAP SERPL CALC-SCNC: 14 MMOL/L
AST SERPL-CCNC: 16 U/L (ref 5–40)
BILIRUB SERPL-MCNC: 0.3 MG/DL (ref 0.1–1.2)
BUN SERPL-MCNC: 8 MG/DL (ref 8–25)
CALCIUM SERPL-MCNC: 9 MG/DL (ref 8.5–10.4)
CHLORIDE SERPL-SCNC: 107 MMOL/L (ref 97–107)
CO2 SERPL-SCNC: 17 MMOL/L (ref 24–31)
CREAT SERPL-MCNC: 0.6 MG/DL (ref 0.4–1.6)
CREAT UR-MCNC: 148.5 MG/DL
ERYTHROCYTE [DISTWIDTH] IN BLOOD BY AUTOMATED COUNT: 14.3 % (ref 11.5–14.5)
GFR SERPL CREATININE-BSD FRML MDRD: >90 ML/MIN/1.73M*2
GLUCOSE SERPL-MCNC: 142 MG/DL (ref 65–99)
HCT VFR BLD AUTO: 34.8 % (ref 36–46)
HGB BLD-MCNC: 11.3 G/DL (ref 12–16)
MCH RBC QN AUTO: 27.6 PG (ref 26–34)
MCHC RBC AUTO-ENTMCNC: 32.5 G/DL (ref 32–36)
MCV RBC AUTO: 85 FL (ref 80–100)
NRBC BLD-RTO: 0 /100 WBCS (ref 0–0)
PLATELET # BLD AUTO: 175 X10*3/UL (ref 150–450)
POTASSIUM SERPL-SCNC: 3.8 MMOL/L (ref 3.4–5.1)
PROT SERPL-MCNC: 5.9 G/DL (ref 5.9–7.9)
PROT UR-MCNC: 30 MG/DL
PROT/CREAT UR: 0.2 MG/MG CREAT
RBC # BLD AUTO: 4.1 X10*6/UL (ref 4–5.2)
SODIUM SERPL-SCNC: 138 MMOL/L (ref 133–145)
URATE SERPL-MCNC: 3.6 MG/DL (ref 2.5–6.8)
WBC # BLD AUTO: 10.8 X10*3/UL (ref 4.4–11.3)

## 2023-12-12 PROCEDURE — 84156 ASSAY OF PROTEIN URINE: CPT | Performed by: OBSTETRICS & GYNECOLOGY

## 2023-12-12 PROCEDURE — 85027 COMPLETE CBC AUTOMATED: CPT | Performed by: OBSTETRICS & GYNECOLOGY

## 2023-12-12 PROCEDURE — 36415 COLL VENOUS BLD VENIPUNCTURE: CPT | Performed by: OBSTETRICS & GYNECOLOGY

## 2023-12-12 PROCEDURE — 80053 COMPREHEN METABOLIC PANEL: CPT | Performed by: OBSTETRICS & GYNECOLOGY

## 2023-12-12 PROCEDURE — 82570 ASSAY OF URINE CREATININE: CPT | Performed by: OBSTETRICS & GYNECOLOGY

## 2023-12-12 PROCEDURE — 99214 OFFICE O/P EST MOD 30 MIN: CPT | Mod: 25

## 2023-12-12 PROCEDURE — 84550 ASSAY OF BLOOD/URIC ACID: CPT | Performed by: OBSTETRICS & GYNECOLOGY

## 2023-12-12 RX ORDER — LIDOCAINE HYDROCHLORIDE 10 MG/ML
0.5 INJECTION INFILTRATION; PERINEURAL ONCE AS NEEDED
Status: DISCONTINUED | OUTPATIENT
Start: 2023-12-12 | End: 2023-12-13 | Stop reason: HOSPADM

## 2023-12-12 ASSESSMENT — PAIN SCALES - GENERAL
PAINLEVEL_OUTOF10: 0 - NO PAIN
PAINLEVEL_OUTOF10: 5 - MODERATE PAIN
PAINLEVEL_OUTOF10: 0 - NO PAIN
PAINLEVEL: 0 - NO PAIN

## 2023-12-13 ENCOUNTER — TELEPHONE (OUTPATIENT)
Dept: OBSTETRICS AND GYNECOLOGY | Facility: CLINIC | Age: 23
End: 2023-12-13

## 2023-12-13 ENCOUNTER — OFFICE VISIT (OUTPATIENT)
Dept: OBSTETRICS AND GYNECOLOGY | Facility: CLINIC | Age: 23
End: 2023-12-13
Payer: COMMERCIAL

## 2023-12-13 ENCOUNTER — APPOINTMENT (OUTPATIENT)
Dept: OBSTETRICS AND GYNECOLOGY | Facility: CLINIC | Age: 23
End: 2023-12-13
Payer: COMMERCIAL

## 2023-12-13 VITALS — SYSTOLIC BLOOD PRESSURE: 120 MMHG | DIASTOLIC BLOOD PRESSURE: 84 MMHG

## 2023-12-13 DIAGNOSIS — O13.3 GESTATIONAL HYPERTENSION, THIRD TRIMESTER (HHS-HCC): Primary | ICD-10-CM

## 2023-12-13 PROCEDURE — 99212 OFFICE O/P EST SF 10 MIN: CPT | Performed by: OBSTETRICS & GYNECOLOGY

## 2023-12-13 PROCEDURE — 1036F TOBACCO NON-USER: CPT | Performed by: OBSTETRICS & GYNECOLOGY

## 2023-12-13 PROCEDURE — 3074F SYST BP LT 130 MM HG: CPT | Performed by: OBSTETRICS & GYNECOLOGY

## 2023-12-13 PROCEDURE — 3079F DIAST BP 80-89 MM HG: CPT | Performed by: OBSTETRICS & GYNECOLOGY

## 2023-12-13 NOTE — H&P
Obstetrical Admission History and Physical     Margot Nichols is a 22 y.o.  at 35w6d. Estimated Date of Delivery: 1/10/24. She has had prenatal care with Dr Maurer .    Subjective   Margot presents to triage with complaint of elevated BP at home. She has been taking her BP because of having gestational hypertension with her last pregnancy. She has a headache but declines tylenol for it. She denies chest pain, sob or RUQ pain. She has had good fetal movement. She denies vaginal bleeding or leakage.       Obstetrical History   OB History    Para Term  AB Living   2 1 1   0 1   SAB IAB Ectopic Multiple Live Births   0 0 0 0 1      # Outcome Date GA Lbr Vikas/2nd Weight Sex Delivery Anes PTL Lv   2 Current            1 Term    3317 g M Vag-Spont   YOSHI       Past Medical History  Past Medical History:   Diagnosis Date    Other conditions influencing health status     Full-term infant    Personal history of other diseases of the digestive system 12/15/2014    History of gastroesophageal reflux (GERD)    Personal history of other specified conditions 12/15/2014    History of vomiting        Past Surgical History   Past Surgical History:   Procedure Laterality Date    MYRINGOTOMY W/ TUBES  2014    Myringotomy - With Ventilating Tube Insertion    OTHER SURGICAL HISTORY  2021    Endoscopy       Social History  Social History     Tobacco Use    Smoking status: Never    Smokeless tobacco: Never   Substance Use Topics    Alcohol use: Never     Substance and Sexual Activity   Drug Use Never       Allergies  Patient has no known allergies.     Medications  Medications Prior to Admission   Medication Sig Dispense Refill Last Dose    buPROPion XL (Wellbutrin XL) 300 mg 24 hr tablet Take 1 tablet (300 mg) by mouth once daily in the morning.   2023 at 0900    cetirizine (ZyrTEC) 10 mg tablet Take 1 tablet (10 mg) by mouth once daily.   2023 at 0900    pantoprazole (ProtoNix) 40 mg EC tablet  TAKE 1 TABLET BY MOUTH EVERY DAY 90 tablet 0 12/12/2023 at 0900    prenatal vit 91/iron/folic/dha (PRENATAL + DHA ORAL) Prenatal + DHA   12/12/2023 at 0900       Last Vitals  Temp Pulse Resp BP MAP O2 Sat   36.7 °C (98.1 °F) 104 19 (!) 148/95   98 %     Physical Examination  GENERAL: Examination reveals a well developed, well nourished, gravid female in no acute distress. She is alert and cooperative.  ABDOMEN: soft, gravid, nontender, nondistended, no abnormal masses, no epigastric pain  FHR is NST reactice   [place] reading: none  The fetus is in a vertex presentation, determined by Leopold's maneuver  CERVIX: deferred  EXTREMITIES: no redness or tenderness in the calves or thighs, no edema    Lab Review  Lab Results   Component Value Date    WBC 10.8 12/12/2023    HGB 11.3 (L) 12/12/2023    HCT 34.8 (L) 12/12/2023     12/12/2023     Lab Results   Component Value Date    GLUCOSE 142 (H) 12/12/2023     12/12/2023    K 3.8 12/12/2023     12/12/2023    CO2 17 (L) 12/12/2023    ANIONGAP 14 12/12/2023    BUN 8 12/12/2023    CREATININE 0.60 12/12/2023    EGFR >90 12/12/2023    CALCIUM 9.0 12/12/2023    ALBUMIN 3.6 12/12/2023    PROT 5.9 12/12/2023    ALKPHOS 119 12/12/2023    ALT 13 12/12/2023    AST 16 12/12/2023    BILITOT 0.3 12/12/2023     Lab Results   Component Value Date    UTPCR 0.20 (H) 12/12/2023       Assessment/Plan    Active Problems:    Gestational hypertension, third trimester  Labs wnl. Reviewed precautions. She has followup with Dr Maurer on Thursday.

## 2023-12-13 NOTE — TELEPHONE ENCOUNTER
36 weeks pregnant.  Pt c/o elevated bp readings today.  130/116 and 154/95.  Pt denies any headache but is having some vision changes, is seeing spots.  Pt states she was on L&D last night for bp monitoring/labs and has an appt with Dr. Maurer tomorrow.  Per Dr. Maurer, pt to come in for bp check today

## 2023-12-14 ENCOUNTER — ROUTINE PRENATAL (OUTPATIENT)
Dept: OBSTETRICS AND GYNECOLOGY | Facility: CLINIC | Age: 23
End: 2023-12-14
Payer: COMMERCIAL

## 2023-12-14 VITALS — SYSTOLIC BLOOD PRESSURE: 128 MMHG | DIASTOLIC BLOOD PRESSURE: 84 MMHG | BODY MASS INDEX: 43.9 KG/M2 | WEIGHT: 272 LBS

## 2023-12-14 DIAGNOSIS — Z3A.36 36 WEEKS GESTATION OF PREGNANCY (HHS-HCC): ICD-10-CM

## 2023-12-14 DIAGNOSIS — O36.63X1 EXCESSIVE FETAL GROWTH AFFECTING MANAGEMENT OF PREGNANCY IN THIRD TRIMESTER, FETUS 1 OF MULTIPLE GESTATION (HHS-HCC): ICD-10-CM

## 2023-12-14 DIAGNOSIS — O13.3 GESTATIONAL HYPERTENSION, THIRD TRIMESTER (HHS-HCC): Primary | ICD-10-CM

## 2023-12-14 LAB
POC APPEARANCE, URINE: CLEAR
POC BILIRUBIN, URINE: NEGATIVE
POC BLOOD, URINE: NEGATIVE
POC COLOR, URINE: YELLOW
POC GLUCOSE, URINE: ABNORMAL MG/DL
POC KETONES, URINE: ABNORMAL MG/DL
POC LEUKOCYTES, URINE: NEGATIVE
POC NITRITE,URINE: NEGATIVE
POC PH, URINE: 5.5 PH
POC PROTEIN, URINE: ABNORMAL MG/DL
POC SPECIFIC GRAVITY, URINE: >=1.03
POC UROBILINOGEN, URINE: 0.2 EU/DL

## 2023-12-14 PROCEDURE — 81003 URINALYSIS AUTO W/O SCOPE: CPT | Performed by: OBSTETRICS & GYNECOLOGY

## 2023-12-14 PROCEDURE — 59426 ANTEPARTUM CARE ONLY: CPT | Performed by: OBSTETRICS & GYNECOLOGY

## 2023-12-14 PROCEDURE — 59025 FETAL NON-STRESS TEST: CPT | Performed by: OBSTETRICS & GYNECOLOGY

## 2023-12-14 ASSESSMENT — LIFESTYLE VARIABLES
HOW MANY STANDARD DRINKS CONTAINING ALCOHOL DO YOU HAVE ON A TYPICAL DAY: 1 OR 2
AUDIT-C TOTAL SCORE: 1
HOW OFTEN DO YOU HAVE SIX OR MORE DRINKS ON ONE OCCASION: NEVER
SKIP TO QUESTIONS 9-10: 1
HOW OFTEN DO YOU HAVE A DRINK CONTAINING ALCOHOL: MONTHLY OR LESS

## 2023-12-14 ASSESSMENT — PATIENT HEALTH QUESTIONNAIRE - PHQ9
SUM OF ALL RESPONSES TO PHQ9 QUESTIONS 1 & 2: 0
1. LITTLE INTEREST OR PLEASURE IN DOING THINGS: NOT AT ALL
2. FEELING DOWN, DEPRESSED OR HOPELESS: NOT AT ALL

## 2023-12-14 ASSESSMENT — ENCOUNTER SYMPTOMS
OCCASIONAL FEELINGS OF UNSTEADINESS: 0
LOSS OF SENSATION IN FEET: 0
DEPRESSION: 0

## 2023-12-14 ASSESSMENT — PAIN SCALES - GENERAL: PAINLEVEL_OUTOF10: 0 - NO PAIN

## 2023-12-14 ASSESSMENT — PAIN - FUNCTIONAL ASSESSMENT: PAIN_FUNCTIONAL_ASSESSMENT: 0-10

## 2023-12-20 ENCOUNTER — HOSPITAL ENCOUNTER (INPATIENT)
Facility: HOSPITAL | Age: 23
LOS: 3 days | Discharge: HOME | End: 2023-12-23
Attending: OBSTETRICS & GYNECOLOGY | Admitting: OBSTETRICS & GYNECOLOGY
Payer: COMMERCIAL

## 2023-12-20 ENCOUNTER — APPOINTMENT (OUTPATIENT)
Dept: OBSTETRICS AND GYNECOLOGY | Facility: HOSPITAL | Age: 23
End: 2023-12-20
Payer: COMMERCIAL

## 2023-12-20 DIAGNOSIS — O13.3 GESTATIONAL HYPERTENSION, THIRD TRIMESTER (HHS-HCC): ICD-10-CM

## 2023-12-20 DIAGNOSIS — R52 POSTPARTUM PAIN (HHS-HCC): ICD-10-CM

## 2023-12-20 DIAGNOSIS — R52 PAIN: Primary | ICD-10-CM

## 2023-12-20 PROBLEM — O36.63X0 EXCESSIVE FETAL GROWTH AFFECTING MANAGEMENT OF PREGNANCY IN THIRD TRIMESTER (HHS-HCC): Status: ACTIVE | Noted: 2023-12-20

## 2023-12-20 PROBLEM — O40.3XX0 POLYHYDRAMNIOS IN THIRD TRIMESTER (HHS-HCC): Status: ACTIVE | Noted: 2023-12-20

## 2023-12-20 LAB
ABO GROUP (TYPE) IN BLOOD: NORMAL
ALBUMIN SERPL-MCNC: 3.3 G/DL (ref 3.5–5)
ALP BLD-CCNC: 128 U/L (ref 35–125)
ALT SERPL-CCNC: 20 U/L (ref 5–40)
ANION GAP SERPL CALC-SCNC: 11 MMOL/L
ANTIBODY SCREEN: NORMAL
AST SERPL-CCNC: 18 U/L (ref 5–40)
BILIRUB SERPL-MCNC: 0.3 MG/DL (ref 0.1–1.2)
BUN SERPL-MCNC: 8 MG/DL (ref 8–25)
CALCIUM SERPL-MCNC: 9.3 MG/DL (ref 8.5–10.4)
CHLORIDE SERPL-SCNC: 103 MMOL/L (ref 97–107)
CO2 SERPL-SCNC: 20 MMOL/L (ref 24–31)
CREAT SERPL-MCNC: 0.7 MG/DL (ref 0.4–1.6)
CREAT UR-MCNC: 277.5 MG/DL
ERYTHROCYTE [DISTWIDTH] IN BLOOD BY AUTOMATED COUNT: 14.6 % (ref 11.5–14.5)
GFR SERPL CREATININE-BSD FRML MDRD: >90 ML/MIN/1.73M*2
GLUCOSE SERPL-MCNC: 131 MG/DL (ref 65–99)
HCT VFR BLD AUTO: 34.8 % (ref 36–46)
HGB BLD-MCNC: 11 G/DL (ref 12–16)
MCH RBC QN AUTO: 26.7 PG (ref 26–34)
MCHC RBC AUTO-ENTMCNC: 31.6 G/DL (ref 32–36)
MCV RBC AUTO: 85 FL (ref 80–100)
NRBC BLD-RTO: 0 /100 WBCS (ref 0–0)
PLATELET # BLD AUTO: 164 X10*3/UL (ref 150–450)
POTASSIUM SERPL-SCNC: 4 MMOL/L (ref 3.4–5.1)
PROT SERPL-MCNC: 6.4 G/DL (ref 5.9–7.9)
PROT UR-MCNC: 62 MG/DL
PROT/CREAT UR: 0.22 MG/MG CREAT
RBC # BLD AUTO: 4.12 X10*6/UL (ref 4–5.2)
RH FACTOR (ANTIGEN D): NORMAL
SODIUM SERPL-SCNC: 134 MMOL/L (ref 133–145)
URATE SERPL-MCNC: 4.1 MG/DL (ref 2.5–6.8)
WBC # BLD AUTO: 9.7 X10*3/UL (ref 4.4–11.3)

## 2023-12-20 PROCEDURE — 1220000001 HC OB SEMI-PRIVATE ROOM DAILY

## 2023-12-20 PROCEDURE — 83615 LACTATE (LD) (LDH) ENZYME: CPT | Performed by: OBSTETRICS & GYNECOLOGY

## 2023-12-20 PROCEDURE — 80053 COMPREHEN METABOLIC PANEL: CPT | Performed by: OBSTETRICS & GYNECOLOGY

## 2023-12-20 PROCEDURE — 86780 TREPONEMA PALLIDUM: CPT | Mod: TRILAB | Performed by: OBSTETRICS & GYNECOLOGY

## 2023-12-20 PROCEDURE — 86901 BLOOD TYPING SEROLOGIC RH(D): CPT | Performed by: OBSTETRICS & GYNECOLOGY

## 2023-12-20 PROCEDURE — 36415 COLL VENOUS BLD VENIPUNCTURE: CPT | Performed by: OBSTETRICS & GYNECOLOGY

## 2023-12-20 PROCEDURE — 82570 ASSAY OF URINE CREATININE: CPT | Performed by: OBSTETRICS & GYNECOLOGY

## 2023-12-20 PROCEDURE — 84550 ASSAY OF BLOOD/URIC ACID: CPT | Performed by: OBSTETRICS & GYNECOLOGY

## 2023-12-20 PROCEDURE — 85027 COMPLETE CBC AUTOMATED: CPT | Performed by: OBSTETRICS & GYNECOLOGY

## 2023-12-20 RX ORDER — MISOPROSTOL 100 UG/1
25 TABLET ORAL
Status: ACTIVE | OUTPATIENT
Start: 2023-12-20 | End: 2023-12-21

## 2023-12-20 RX ORDER — CARBOPROST TROMETHAMINE 250 UG/ML
250 INJECTION, SOLUTION INTRAMUSCULAR ONCE AS NEEDED
Status: DISCONTINUED | OUTPATIENT
Start: 2023-12-20 | End: 2023-12-23 | Stop reason: HOSPADM

## 2023-12-20 RX ORDER — LIDOCAINE HYDROCHLORIDE 10 MG/ML
30 INJECTION INFILTRATION; PERINEURAL ONCE AS NEEDED
Status: DISCONTINUED | OUTPATIENT
Start: 2023-12-20 | End: 2023-12-23 | Stop reason: HOSPADM

## 2023-12-20 RX ORDER — OXYTOCIN 10 [USP'U]/ML
10 INJECTION, SOLUTION INTRAMUSCULAR; INTRAVENOUS ONCE AS NEEDED
Status: DISCONTINUED | OUTPATIENT
Start: 2023-12-20 | End: 2023-12-23 | Stop reason: HOSPADM

## 2023-12-20 RX ORDER — METOCLOPRAMIDE HYDROCHLORIDE 5 MG/ML
10 INJECTION INTRAMUSCULAR; INTRAVENOUS EVERY 6 HOURS PRN
Status: DISCONTINUED | OUTPATIENT
Start: 2023-12-20 | End: 2023-12-23 | Stop reason: HOSPADM

## 2023-12-20 RX ORDER — MISOPROSTOL 200 UG/1
800 TABLET ORAL ONCE AS NEEDED
Status: DISCONTINUED | OUTPATIENT
Start: 2023-12-20 | End: 2023-12-23 | Stop reason: HOSPADM

## 2023-12-20 RX ORDER — TRANEXAMIC ACID 100 MG/ML
1000 INJECTION, SOLUTION INTRAVENOUS ONCE AS NEEDED
Status: DISCONTINUED | OUTPATIENT
Start: 2023-12-20 | End: 2023-12-23 | Stop reason: HOSPADM

## 2023-12-20 RX ORDER — LABETALOL HYDROCHLORIDE 5 MG/ML
20 INJECTION, SOLUTION INTRAVENOUS ONCE AS NEEDED
Status: DISCONTINUED | OUTPATIENT
Start: 2023-12-20 | End: 2023-12-23 | Stop reason: HOSPADM

## 2023-12-20 RX ORDER — BUTORPHANOL TARTRATE 2 MG/ML
1 INJECTION INTRAMUSCULAR; INTRAVENOUS EVERY 10 MIN PRN
Status: DISCONTINUED | OUTPATIENT
Start: 2023-12-20 | End: 2023-12-23 | Stop reason: HOSPADM

## 2023-12-20 RX ORDER — OXYTOCIN/0.9 % SODIUM CHLORIDE 30/500 ML
60 PLASTIC BAG, INJECTION (ML) INTRAVENOUS ONCE AS NEEDED
Status: DISCONTINUED | OUTPATIENT
Start: 2023-12-20 | End: 2023-12-23 | Stop reason: HOSPADM

## 2023-12-20 RX ORDER — OXYTOCIN/0.9 % SODIUM CHLORIDE 30/500 ML
2-30 PLASTIC BAG, INJECTION (ML) INTRAVENOUS CONTINUOUS
Status: DISCONTINUED | OUTPATIENT
Start: 2023-12-20 | End: 2023-12-23 | Stop reason: HOSPADM

## 2023-12-20 RX ORDER — ONDANSETRON 4 MG/1
4 TABLET, FILM COATED ORAL EVERY 6 HOURS PRN
Status: DISCONTINUED | OUTPATIENT
Start: 2023-12-20 | End: 2023-12-23 | Stop reason: HOSPADM

## 2023-12-20 RX ORDER — METHYLERGONOVINE MALEATE 0.2 MG/ML
0.2 INJECTION INTRAVENOUS ONCE AS NEEDED
Status: DISCONTINUED | OUTPATIENT
Start: 2023-12-20 | End: 2023-12-23 | Stop reason: HOSPADM

## 2023-12-20 RX ORDER — METOCLOPRAMIDE 10 MG/1
10 TABLET ORAL EVERY 6 HOURS PRN
Status: DISCONTINUED | OUTPATIENT
Start: 2023-12-20 | End: 2023-12-23 | Stop reason: HOSPADM

## 2023-12-20 RX ORDER — HYDRALAZINE HYDROCHLORIDE 20 MG/ML
5 INJECTION INTRAMUSCULAR; INTRAVENOUS ONCE AS NEEDED
Status: DISCONTINUED | OUTPATIENT
Start: 2023-12-20 | End: 2023-12-23 | Stop reason: HOSPADM

## 2023-12-20 RX ORDER — NIFEDIPINE 10 MG/1
10 CAPSULE ORAL ONCE AS NEEDED
Status: DISCONTINUED | OUTPATIENT
Start: 2023-12-20 | End: 2023-12-23 | Stop reason: HOSPADM

## 2023-12-20 RX ORDER — SODIUM CHLORIDE, SODIUM LACTATE, POTASSIUM CHLORIDE, CALCIUM CHLORIDE 600; 310; 30; 20 MG/100ML; MG/100ML; MG/100ML; MG/100ML
125 INJECTION, SOLUTION INTRAVENOUS CONTINUOUS
Status: DISCONTINUED | OUTPATIENT
Start: 2023-12-20 | End: 2023-12-23 | Stop reason: HOSPADM

## 2023-12-20 RX ORDER — ONDANSETRON HYDROCHLORIDE 2 MG/ML
4 INJECTION, SOLUTION INTRAVENOUS EVERY 6 HOURS PRN
Status: DISCONTINUED | OUTPATIENT
Start: 2023-12-20 | End: 2023-12-23 | Stop reason: HOSPADM

## 2023-12-20 RX ORDER — LOPERAMIDE HYDROCHLORIDE 2 MG/1
4 CAPSULE ORAL EVERY 2 HOUR PRN
Status: DISCONTINUED | OUTPATIENT
Start: 2023-12-20 | End: 2023-12-23 | Stop reason: HOSPADM

## 2023-12-20 RX ORDER — TERBUTALINE SULFATE 1 MG/ML
0.25 INJECTION SUBCUTANEOUS ONCE AS NEEDED
Status: DISCONTINUED | OUTPATIENT
Start: 2023-12-20 | End: 2023-12-23 | Stop reason: HOSPADM

## 2023-12-20 SDOH — SOCIAL STABILITY: SOCIAL INSECURITY: DO YOU FEEL ANYONE HAS EXPLOITED OR TAKEN ADVANTAGE OF YOU FINANCIALLY OR OF YOUR PERSONAL PROPERTY?: NO

## 2023-12-20 SDOH — HEALTH STABILITY: MENTAL HEALTH: HAVE YOU USED ANY SUBSTANCES (CANABIS, COCAINE, HEROIN, HALLUCINOGENS, INHALANTS, ETC.) IN THE PAST 12 MONTHS?: NO

## 2023-12-20 SDOH — HEALTH STABILITY: MENTAL HEALTH: WISH TO BE DEAD (PAST 1 MONTH): NO

## 2023-12-20 SDOH — SOCIAL STABILITY: SOCIAL INSECURITY: ABUSE SCREEN: ADULT

## 2023-12-20 SDOH — SOCIAL STABILITY: SOCIAL INSECURITY: ARE YOU OR HAVE YOU BEEN THREATENED OR ABUSED PHYSICALLY, EMOTIONALLY, OR SEXUALLY BY ANYONE?: NO

## 2023-12-20 SDOH — HEALTH STABILITY: MENTAL HEALTH: HAVE YOU USED ANY PRESCRIPTION DRUGS OTHER THAN PRESCRIBED IN THE PAST 12 MONTHS?: NO

## 2023-12-20 SDOH — ECONOMIC STABILITY: HOUSING INSECURITY: DO YOU FEEL UNSAFE GOING BACK TO THE PLACE WHERE YOU ARE LIVING?: NO

## 2023-12-20 SDOH — SOCIAL STABILITY: SOCIAL INSECURITY: ARE THERE ANY APPARENT SIGNS OF INJURIES/BEHAVIORS THAT COULD BE RELATED TO ABUSE/NEGLECT?: NO

## 2023-12-20 SDOH — SOCIAL STABILITY: SOCIAL INSECURITY: DOES ANYONE TRY TO KEEP YOU FROM HAVING/CONTACTING OTHER FRIENDS OR DOING THINGS OUTSIDE YOUR HOME?: NO

## 2023-12-20 SDOH — SOCIAL STABILITY: SOCIAL INSECURITY: HAVE YOU HAD THOUGHTS OF HARMING ANYONE ELSE?: NO

## 2023-12-20 SDOH — SOCIAL STABILITY: SOCIAL INSECURITY: HAS ANYONE EVER THREATENED TO HURT YOUR FAMILY OR YOUR PETS?: NO

## 2023-12-20 SDOH — HEALTH STABILITY: MENTAL HEALTH: SUICIDAL BEHAVIOR (LIFETIME): NO

## 2023-12-20 SDOH — SOCIAL STABILITY: SOCIAL INSECURITY: VERBAL ABUSE: DENIES

## 2023-12-20 SDOH — HEALTH STABILITY: MENTAL HEALTH: WERE YOU ABLE TO COMPLETE ALL THE BEHAVIORAL HEALTH SCREENINGS?: YES

## 2023-12-20 SDOH — SOCIAL STABILITY: SOCIAL INSECURITY: PHYSICAL ABUSE: DENIES

## 2023-12-20 SDOH — HEALTH STABILITY: MENTAL HEALTH: NON-SPECIFIC ACTIVE SUICIDAL THOUGHTS (PAST 1 MONTH): NO

## 2023-12-20 ASSESSMENT — LIFESTYLE VARIABLES
SKIP TO QUESTIONS 9-10: 1
AUDIT-C TOTAL SCORE: 0
HOW OFTEN DO YOU HAVE 6 OR MORE DRINKS ON ONE OCCASION: NEVER
HOW MANY STANDARD DRINKS CONTAINING ALCOHOL DO YOU HAVE ON A TYPICAL DAY: PATIENT DOES NOT DRINK
AUDIT-C TOTAL SCORE: 0
HOW OFTEN DO YOU HAVE A DRINK CONTAINING ALCOHOL: NEVER

## 2023-12-20 ASSESSMENT — PATIENT HEALTH QUESTIONNAIRE - PHQ9
2. FEELING DOWN, DEPRESSED OR HOPELESS: NOT AT ALL
1. LITTLE INTEREST OR PLEASURE IN DOING THINGS: MORE THAN HALF THE DAYS
SUM OF ALL RESPONSES TO PHQ9 QUESTIONS 1 & 2: 2

## 2023-12-20 ASSESSMENT — PAIN SCALES - GENERAL: PAINLEVEL_OUTOF10: 0 - NO PAIN

## 2023-12-20 ASSESSMENT — ACTIVITIES OF DAILY LIVING (ADL): LACK_OF_TRANSPORTATION: NO

## 2023-12-21 ENCOUNTER — ANESTHESIA (OUTPATIENT)
Dept: OBSTETRICS AND GYNECOLOGY | Facility: HOSPITAL | Age: 23
End: 2023-12-21
Payer: COMMERCIAL

## 2023-12-21 ENCOUNTER — ANESTHESIA EVENT (OUTPATIENT)
Dept: OBSTETRICS AND GYNECOLOGY | Facility: HOSPITAL | Age: 23
End: 2023-12-21
Payer: COMMERCIAL

## 2023-12-21 LAB
LDH SERPL-CCNC: 227 U/L (ref 91–227)
T PALLIDUM AB SER QL: NONREACTIVE

## 2023-12-21 PROCEDURE — 59410 OBSTETRICAL CARE: CPT | Performed by: OBSTETRICS & GYNECOLOGY

## 2023-12-21 PROCEDURE — 10907ZC DRAINAGE OF AMNIOTIC FLUID, THERAPEUTIC FROM PRODUCTS OF CONCEPTION, VIA NATURAL OR ARTIFICIAL OPENING: ICD-10-PCS | Performed by: OBSTETRICS & GYNECOLOGY

## 2023-12-21 PROCEDURE — 2500000004 HC RX 250 GENERAL PHARMACY W/ HCPCS (ALT 636 FOR OP/ED): Performed by: NURSE ANESTHETIST, CERTIFIED REGISTERED

## 2023-12-21 PROCEDURE — 59409 OBSTETRICAL CARE: CPT | Performed by: OBSTETRICS & GYNECOLOGY

## 2023-12-21 PROCEDURE — 2500000005 HC RX 250 GENERAL PHARMACY W/O HCPCS: Performed by: NURSE ANESTHETIST, CERTIFIED REGISTERED

## 2023-12-21 PROCEDURE — 2500000004 HC RX 250 GENERAL PHARMACY W/ HCPCS (ALT 636 FOR OP/ED): Performed by: OBSTETRICS & GYNECOLOGY

## 2023-12-21 PROCEDURE — 2500000001 HC RX 250 WO HCPCS SELF ADMINISTERED DRUGS (ALT 637 FOR MEDICARE OP): Performed by: OBSTETRICS & GYNECOLOGY

## 2023-12-21 PROCEDURE — 1220000001 HC OB SEMI-PRIVATE ROOM DAILY

## 2023-12-21 PROCEDURE — 3E0P7VZ INTRODUCTION OF HORMONE INTO FEMALE REPRODUCTIVE, VIA NATURAL OR ARTIFICIAL OPENING: ICD-10-PCS | Performed by: OBSTETRICS & GYNECOLOGY

## 2023-12-21 PROCEDURE — 01967 NEURAXL LBR ANES VAG DLVR: CPT | Performed by: NURSE ANESTHETIST, CERTIFIED REGISTERED

## 2023-12-21 PROCEDURE — 94760 N-INVAS EAR/PLS OXIMETRY 1: CPT

## 2023-12-21 PROCEDURE — 2720000007 HC OR 272 NO HCPCS

## 2023-12-21 RX ORDER — ACETAMINOPHEN 500 MG
1000 TABLET ORAL ONCE
Status: COMPLETED | OUTPATIENT
Start: 2023-12-21 | End: 2023-12-21

## 2023-12-21 RX ORDER — LABETALOL HYDROCHLORIDE 5 MG/ML
20 INJECTION, SOLUTION INTRAVENOUS ONCE AS NEEDED
Status: DISCONTINUED | OUTPATIENT
Start: 2023-12-21 | End: 2023-12-23 | Stop reason: HOSPADM

## 2023-12-21 RX ORDER — BISACODYL 10 MG/1
10 SUPPOSITORY RECTAL DAILY PRN
Status: DISCONTINUED | OUTPATIENT
Start: 2023-12-21 | End: 2023-12-23 | Stop reason: HOSPADM

## 2023-12-21 RX ORDER — FENTANYL/BUPIVACAINE/NS/PF 2MCG/ML-.1
PLASTIC BAG, INJECTION (ML) INJECTION
Status: COMPLETED
Start: 2023-12-21 | End: 2023-12-21

## 2023-12-21 RX ORDER — FENTANYL CITRATE 50 UG/ML
INJECTION, SOLUTION INTRAMUSCULAR; INTRAVENOUS
Status: COMPLETED
Start: 2023-12-21 | End: 2023-12-21

## 2023-12-21 RX ORDER — LOPERAMIDE HYDROCHLORIDE 2 MG/1
4 CAPSULE ORAL EVERY 2 HOUR PRN
Status: DISCONTINUED | OUTPATIENT
Start: 2023-12-21 | End: 2023-12-23 | Stop reason: HOSPADM

## 2023-12-21 RX ORDER — LIDOCAINE HYDROCHLORIDE 20 MG/ML
INJECTION, SOLUTION EPIDURAL; INFILTRATION; INTRACAUDAL; PERINEURAL AS NEEDED
Status: DISCONTINUED | OUTPATIENT
Start: 2023-12-21 | End: 2023-12-21

## 2023-12-21 RX ORDER — TRANEXAMIC ACID 100 MG/ML
1000 INJECTION, SOLUTION INTRAVENOUS ONCE AS NEEDED
Status: DISCONTINUED | OUTPATIENT
Start: 2023-12-21 | End: 2023-12-23 | Stop reason: HOSPADM

## 2023-12-21 RX ORDER — ADHESIVE BANDAGE
10 BANDAGE TOPICAL
Status: DISCONTINUED | OUTPATIENT
Start: 2023-12-21 | End: 2023-12-23 | Stop reason: HOSPADM

## 2023-12-21 RX ORDER — SIMETHICONE 80 MG
80 TABLET,CHEWABLE ORAL 4 TIMES DAILY PRN
Status: DISCONTINUED | OUTPATIENT
Start: 2023-12-21 | End: 2023-12-23 | Stop reason: HOSPADM

## 2023-12-21 RX ORDER — ONDANSETRON HYDROCHLORIDE 2 MG/ML
4 INJECTION, SOLUTION INTRAVENOUS EVERY 6 HOURS PRN
Status: DISCONTINUED | OUTPATIENT
Start: 2023-12-21 | End: 2023-12-23 | Stop reason: HOSPADM

## 2023-12-21 RX ORDER — AMOXICILLIN 250 MG
2 CAPSULE ORAL NIGHTLY PRN
Status: DISCONTINUED | OUTPATIENT
Start: 2023-12-21 | End: 2023-12-23 | Stop reason: HOSPADM

## 2023-12-21 RX ORDER — MISOPROSTOL 200 UG/1
800 TABLET ORAL ONCE AS NEEDED
Status: DISCONTINUED | OUTPATIENT
Start: 2023-12-21 | End: 2023-12-23 | Stop reason: HOSPADM

## 2023-12-21 RX ORDER — ONDANSETRON 4 MG/1
4 TABLET, FILM COATED ORAL EVERY 6 HOURS PRN
Status: DISCONTINUED | OUTPATIENT
Start: 2023-12-21 | End: 2023-12-23 | Stop reason: HOSPADM

## 2023-12-21 RX ORDER — POLYETHYLENE GLYCOL 3350 17 G/17G
17 POWDER, FOR SOLUTION ORAL 2 TIMES DAILY PRN
Status: DISCONTINUED | OUTPATIENT
Start: 2023-12-21 | End: 2023-12-23 | Stop reason: HOSPADM

## 2023-12-21 RX ORDER — BUPIVACAINE HYDROCHLORIDE 2.5 MG/ML
INJECTION, SOLUTION EPIDURAL; INFILTRATION; INTRACAUDAL AS NEEDED
Status: DISCONTINUED | OUTPATIENT
Start: 2023-12-21 | End: 2023-12-21

## 2023-12-21 RX ORDER — FENTANYL/BUPIVACAINE/NS/PF 2MCG/ML-.1
0-30 PLASTIC BAG, INJECTION (ML) INJECTION CONTINUOUS
Status: DISCONTINUED | OUTPATIENT
Start: 2023-12-21 | End: 2023-12-23 | Stop reason: HOSPADM

## 2023-12-21 RX ORDER — CARBOPROST TROMETHAMINE 250 UG/ML
250 INJECTION, SOLUTION INTRAMUSCULAR ONCE AS NEEDED
Status: DISCONTINUED | OUTPATIENT
Start: 2023-12-21 | End: 2023-12-23 | Stop reason: HOSPADM

## 2023-12-21 RX ORDER — CALCIUM CARBONATE 200(500)MG
500 TABLET,CHEWABLE ORAL 4 TIMES DAILY PRN
Status: DISCONTINUED | OUTPATIENT
Start: 2023-12-21 | End: 2023-12-23 | Stop reason: HOSPADM

## 2023-12-21 RX ORDER — FENTANYL CITRATE 50 UG/ML
INJECTION, SOLUTION INTRAMUSCULAR; INTRAVENOUS AS NEEDED
Status: DISCONTINUED | OUTPATIENT
Start: 2023-12-21 | End: 2023-12-21

## 2023-12-21 RX ORDER — OXYTOCIN 10 [USP'U]/ML
10 INJECTION, SOLUTION INTRAMUSCULAR; INTRAVENOUS ONCE AS NEEDED
Status: DISCONTINUED | OUTPATIENT
Start: 2023-12-21 | End: 2023-12-23 | Stop reason: HOSPADM

## 2023-12-21 RX ORDER — ACETAMINOPHEN 325 MG/1
975 TABLET ORAL EVERY 6 HOURS SCHEDULED
Status: DISCONTINUED | OUTPATIENT
Start: 2023-12-21 | End: 2023-12-23 | Stop reason: HOSPADM

## 2023-12-21 RX ORDER — NIFEDIPINE 10 MG/1
10 CAPSULE ORAL ONCE AS NEEDED
Status: DISCONTINUED | OUTPATIENT
Start: 2023-12-21 | End: 2023-12-23 | Stop reason: HOSPADM

## 2023-12-21 RX ORDER — METHYLERGONOVINE MALEATE 0.2 MG/ML
0.2 INJECTION INTRAVENOUS ONCE AS NEEDED
Status: DISCONTINUED | OUTPATIENT
Start: 2023-12-21 | End: 2023-12-23 | Stop reason: HOSPADM

## 2023-12-21 RX ORDER — IBUPROFEN 600 MG/1
600 TABLET ORAL EVERY 6 HOURS SCHEDULED
Status: DISCONTINUED | OUTPATIENT
Start: 2023-12-21 | End: 2023-12-23 | Stop reason: HOSPADM

## 2023-12-21 RX ORDER — OXYTOCIN/0.9 % SODIUM CHLORIDE 30/500 ML
60 PLASTIC BAG, INJECTION (ML) INTRAVENOUS ONCE AS NEEDED
Status: DISCONTINUED | OUTPATIENT
Start: 2023-12-21 | End: 2023-12-23 | Stop reason: HOSPADM

## 2023-12-21 RX ORDER — HYDRALAZINE HYDROCHLORIDE 20 MG/ML
5 INJECTION INTRAMUSCULAR; INTRAVENOUS ONCE AS NEEDED
Status: DISCONTINUED | OUTPATIENT
Start: 2023-12-21 | End: 2023-12-23 | Stop reason: HOSPADM

## 2023-12-21 RX ORDER — LIDOCAINE HYDROCHLORIDE 20 MG/ML
INJECTION, SOLUTION EPIDURAL; INFILTRATION; INTRACAUDAL; PERINEURAL
Status: COMPLETED
Start: 2023-12-21 | End: 2023-12-21

## 2023-12-21 RX ADMIN — Medication 2 MILLI-UNITS/MIN: at 00:45

## 2023-12-21 RX ADMIN — Medication 15 ML/HR: at 11:13

## 2023-12-21 RX ADMIN — ACETAMINOPHEN 1000 MG: 500 TABLET ORAL at 04:00

## 2023-12-21 RX ADMIN — Medication 500 MG: at 12:53

## 2023-12-21 RX ADMIN — SODIUM CHLORIDE, SODIUM LACTATE, POTASSIUM CHLORIDE, AND CALCIUM CHLORIDE 125 ML/HR: 600; 310; 30; 20 INJECTION, SOLUTION INTRAVENOUS at 11:29

## 2023-12-21 RX ADMIN — ONDANSETRON 4 MG: 2 INJECTION INTRAMUSCULAR; INTRAVENOUS at 12:57

## 2023-12-21 RX ADMIN — FENTANYL CITRATE 50 MCG: 0.05 INJECTION, SOLUTION INTRAMUSCULAR; INTRAVENOUS at 17:20

## 2023-12-21 RX ADMIN — LIDOCAINE HYDROCHLORIDE 100 MG: 20 INJECTION, SOLUTION EPIDURAL; INFILTRATION; INTRACAUDAL; PERINEURAL at 17:20

## 2023-12-21 RX ADMIN — SODIUM CHLORIDE, SODIUM LACTATE, POTASSIUM CHLORIDE, AND CALCIUM CHLORIDE 125 ML/HR: 600; 310; 30; 20 INJECTION, SOLUTION INTRAVENOUS at 00:29

## 2023-12-21 RX ADMIN — SODIUM CHLORIDE, SODIUM LACTATE, POTASSIUM CHLORIDE, AND CALCIUM CHLORIDE 500 ML: 600; 310; 30; 20 INJECTION, SOLUTION INTRAVENOUS at 10:54

## 2023-12-21 RX ADMIN — SODIUM CHLORIDE, SODIUM LACTATE, POTASSIUM CHLORIDE, AND CALCIUM CHLORIDE 125 ML/HR: 600; 310; 30; 20 INJECTION, SOLUTION INTRAVENOUS at 07:51

## 2023-12-21 RX ADMIN — BUPIVACAINE HYDROCHLORIDE 5 ML: 2.5 INJECTION, SOLUTION EPIDURAL; INFILTRATION; INTRACAUDAL; PERINEURAL at 11:12

## 2023-12-21 SDOH — HEALTH STABILITY: MENTAL HEALTH: CURRENT SMOKER: 0

## 2023-12-21 ASSESSMENT — PAIN SCALES - GENERAL
PAINLEVEL_OUTOF10: 0 - NO PAIN
PAINLEVEL_OUTOF10: 6
PAINLEVEL_OUTOF10: 4
PAINLEVEL_OUTOF10: 0 - NO PAIN
PAINLEVEL_OUTOF10: 2
PAINLEVEL_OUTOF10: 0 - NO PAIN
PAINLEVEL_OUTOF10: 5 - MODERATE PAIN
PAINLEVEL_OUTOF10: 0 - NO PAIN
PAINLEVEL_OUTOF10: 0 - NO PAIN
PAINLEVEL_OUTOF10: 5 - MODERATE PAIN
PAINLEVEL_OUTOF10: 0 - NO PAIN
PAINLEVEL_OUTOF10: 10 - WORST POSSIBLE PAIN
PAINLEVEL_OUTOF10: 0 - NO PAIN
PAINLEVEL_OUTOF10: 0 - NO PAIN
PAINLEVEL_OUTOF10: 10 - WORST POSSIBLE PAIN
PAINLEVEL_OUTOF10: 2
PAINLEVEL_OUTOF10: 4
PAINLEVEL_OUTOF10: 3
PAINLEVEL_OUTOF10: 10 - WORST POSSIBLE PAIN
PAINLEVEL_OUTOF10: 0 - NO PAIN
PAINLEVEL_OUTOF10: 0 - NO PAIN
PAINLEVEL_OUTOF10: 7
PAINLEVEL_OUTOF10: 0 - NO PAIN
PAINLEVEL_OUTOF10: 0 - NO PAIN
PAINLEVEL_OUTOF10: 5 - MODERATE PAIN
PAINLEVEL_OUTOF10: 4
PAINLEVEL_OUTOF10: 0 - NO PAIN
PAINLEVEL_OUTOF10: 0 - NO PAIN

## 2023-12-21 ASSESSMENT — PAIN DESCRIPTION - DESCRIPTORS: DESCRIPTORS: ACHING

## 2023-12-21 ASSESSMENT — PAIN - FUNCTIONAL ASSESSMENT: PAIN_FUNCTIONAL_ASSESSMENT: 0-10

## 2023-12-21 ASSESSMENT — PAIN DESCRIPTION - LOCATION: LOCATION: HEAD

## 2023-12-21 NOTE — ANESTHESIA PROCEDURE NOTES
Epidural Block    Patient location during procedure: OB  Start time: 12/21/2023 11:00 AM  End time: 12/21/2023 11:18 AM  Reason for block: labor analgesia  Staffing  Performed: CRNA   Authorized by: NATHALIA Damon    Performed by: NATHALIA Damon    Preanesthetic Checklist  Completed: patient identified, IV checked, risks and benefits discussed, surgical consent, pre-op evaluation, timeout performed and sterile techniques followed  Block Timeout  RN/Licensed healthcare professional reads aloud to the Anesthesia provider and entire team: Patient identity, procedure with side and site, patient position, and as applicable the availability of implants/special equipment/special requirements.  Patient on coagulant treatment: no  Timeout performed at: 12/21/2023 11:01 AM  Block Placement  Patient position: sitting  Prep: ChloraPrep  Sterility prep: cap, drape, gloves, gown, mask and hand  Sedation level: no sedation  Patient monitoring: blood pressure, continuous pulse oximetry and heart rate  Approach: midline  Local numbing: lidocaine 1% to skin and subcutaneous tissues  Vertebral space: lumbar  Lumbar location: L3-L4  Epidural  Loss of resistance technique: saline  Guidance: landmark technique        Needle  Needle type: Tuohy   Needle gauge: 17  Needle length: 10 cm  Needle insertion depth: 7 cm  Catheter type: multi-orifice  Catheter size: 19 G  Catheter at skin depth: 12 cm  Catheter securement method: clear occlusive dressing and surgical tape    Test dose: lidocaine 1.5% with epinephrine 1-to-200,000  Test dose given at 11/10/2023 11:51 AM  Test dose: lidocaine 1.5% with epinephrine 1-to-200,000  Test dose result: no positive test dose    PCEA  Medication concentration used: 0.1% Bupivacaine with 2 mcg/mL Fentanyl  Dose (mL): 0  Lockout (minutes): 5  1-Hour Limit (boluses/hr): 27  Basal Rate: 15        Assessment  Sensory level: T10  Block outcome: patient comfortable  Number of  attempts: 1  Events: no positive test dose  Procedure assessment: patient tolerated procedure well with no immediate complications

## 2023-12-21 NOTE — PROGRESS NOTES
Subjective   Margot Nichols is a 22 y.o. female for induction with mild preeclampsia      Objective    Vitals:    12/21/23 0928   BP: 117/74   Pulse: 91   Resp: 16   Temp:    SpO2:      EFM  Cat 1  Cervix  4/70/-1  AROM-clear  Contractions  Q 3-4 minutes      Assessment/Plan      Epidural as needed

## 2023-12-21 NOTE — PROGRESS NOTES
Subjective   Margot Nichols is a 22 y.o. female for induction of labor      Objective    Vitals:    12/21/23 1443   BP:    Pulse: 95   Resp:    Temp:    SpO2: 100%     EFM  Cat 1  Cervix  6/80/-1  Contractions   Q 2-3      Assessment/Plan    Continue pitocin

## 2023-12-21 NOTE — ANESTHESIA PREPROCEDURE EVALUATION
Patient: Margot Nichols    Evaluation Method: In-person visit    Procedure Information    Date: 12/21/23  Procedure: Labor Analgesia       Vitals:    12/21/23 1033   BP: 120/63   Pulse: 88   Resp: 19   Temp: 37.1 °C (98.8 °F)   SpO2: 98%       Past Surgical History:   Procedure Laterality Date    MYRINGOTOMY W/ TUBES  11/03/2014    Myringotomy - With Ventilating Tube Insertion    OTHER SURGICAL HISTORY  07/02/2021    Endoscopy     Past Medical History:   Diagnosis Date    Other conditions influencing health status     Full-term infant    Personal history of other diseases of the digestive system 12/15/2014    History of gastroesophageal reflux (GERD)    Personal history of other specified conditions 12/15/2014    History of vomiting       Current Facility-Administered Medications:     butorphanol (Stadol) injection 1 mg, 1 mg, intravenous, q10 min PRN, Gómez Wood MD    carboprost (Hemabate) injection 250 mcg, 250 mcg, intramuscular, Once PRN, Gómez Wood MD    hydrALAZINE (Apresoline) injection 5 mg, 5 mg, intravenous, Once PRN, Gómez Wood MD    labetaloL (Normodyne,Trandate) injection 20 mg, 20 mg, intravenous, Once PRN, Gómez Wood MD    lactated Ringer's bolus 500 mL, 500 mL, intravenous, Once PRN, Gómez Wood MD    lactated Ringer's bolus 500 mL, 500 mL, intravenous, Once PRN, Gómez Wood MD    lactated Ringer's infusion, 125 mL/hr, intravenous, Continuous, Gómez Wood MD, Last Rate: 125 mL/hr at 12/21/23 0751, 125 mL/hr at 12/21/23 0751    lidocaine (Xylocaine) 10 mg/mL (1 %) injection 300 mg, 30 mL, subcutaneous, Once PRN, Gómez Wood MD    loperamide (Imodium) capsule 4 mg, 4 mg, oral, q2h PRN, Gómez Wood MD    methylergonovine (Methergine) injection 0.2 mg, 0.2 mg, intramuscular, Once PRN, Gómez Wood MD    metoclopramide (Reglan) tablet 10 mg, 10 mg, oral, q6h PRN **OR** metoclopramide (Reglan) injection 10 mg, 10 mg, intravenous,  q6h PRN, Gómez Wood MD    miSOPROStoL (Cytotec) tablet 800 mcg, 800 mcg, rectal, Once PRN, Gómez Wood MD    NIFEdipine (Procardia) capsule 10 mg, 10 mg, oral, Once PRN, Gómez Wood MD    ondansetron (Zofran) tablet 4 mg, 4 mg, oral, q6h PRN **OR** ondansetron (Zofran) injection 4 mg, 4 mg, intravenous, q6h PRN, Gómez Wood MD    oxygen (O2) therapy, , inhalation, Continuous PRN - O2/gases, Gómez Wood MD    oxytocin (Pitocin) bolus from bag, 600 petty-units/min, intravenous, Once PRN, Gómez Wood MD    oxytocin (Pitocin) bolus from bag, 600 petty-units/min, intravenous, Once, Gómez Wood MD    oxytocin (Pitocin) infusion in sodium chloride 0.9% 30 units/500 mL, 60 petty-units/min, intravenous, Once PRN, Gómez Wood MD    oxytocin (Pitocin) infusion in sodium chloride 0.9% 30 units/500 mL, 2-30 petty-units/min, intravenous, Continuous, Gómez Wood MD, Last Rate: 14 mL/hr at 12/21/23 1033, 14 petty-units/min at 12/21/23 1033    oxytocin (Pitocin) injection 10 Units, 10 Units, intramuscular, Once PRN, Gómez Wood MD    oxytocin (Pitocin) injection 10 Units, 10 Units, intramuscular, Once PRN, Gómez Wood MD    terbutaline (Brethine) injection 0.25 mg, 0.25 mg, subcutaneous, Once PRN, Gómez Wood MD    tranexamic acid (Cyklokapron) injection 1,000 mg, 1,000 mg, intravenous, Once PRN, Gómez Wood MD  Prior to Admission medications    Medication Sig Start Date End Date Taking? Authorizing Provider   buPROPion XL (Wellbutrin XL) 300 mg 24 hr tablet Take 1 tablet (300 mg) by mouth once daily in the morning. 8/14/23   Historical Provider, MD   cetirizine (ZyrTEC) 10 mg tablet Take 1 tablet (10 mg) by mouth once daily.    Historical Provider, MD   pantoprazole (ProtoNix) 40 mg EC tablet TAKE 1 TABLET BY MOUTH EVERY DAY 12/4/23   Sheri Maurer MD   prenatal vit 91/iron/folic/dha (PRENATAL + DHA ORAL) Prenatal + DHA    Historical  "Provider, MD     No Known Allergies  Social History     Tobacco Use    Smoking status: Never    Smokeless tobacco: Never   Substance Use Topics    Alcohol use: Never         Chemistry    Lab Results   Component Value Date/Time     2023    K 4.0 2023     2023    CO2 20 (L) 2023    BUN 8 2023    CREATININE 0.70 2023    Lab Results   Component Value Date/Time    CALCIUM 9.3 2023    ALKPHOS 128 (H) 2023    AST 18 2023    ALT 20 2023    BILITOT 0.3 2023          Lab Results   Component Value Date/Time    WBC 9.7 2023    HGB 11.0 (L) 2023    HCT 34.8 (L) 2023     2023     No results found for: \"PROTIME\", \"PTT\", \"INR\"  No results found for this or any previous visit (from the past 4464 hour(s)).  No results found for this or any previous visit from the past 1095 days.   Relevant Problems   Cardiovascular   (+) Chest pain   (+) Pregnancy induced hypertension      Endocrine   (+) Obesity affecting pregnancy, antepartum      Hematology   (+) Thrombocytopenia, unspecified (CMS/HCC)      Circulatory   (+) Gestational hypertension, third trimester      Gravid and    (+) Polyhydramnios in third trimester       Clinical information reviewed:   Tobacco  Allergies  Meds  Problems  Med Hx  Surg Hx   Fam Hx  Soc   Hx        NPO Detail:  No data recorded     OB/Gyn Evaluation    Present Pregnancy    Patient is pregnant now.  (+) , thrombocytopenia of pregnancy   Obstetric History      (-) thrombotyopenia of pregnancy                Physical Exam    Airway  Mallampati: II  TM distance: >3 FB  Neck ROM: full     Cardiovascular - normal exam     Dental    Pulmonary - normal exam     Abdominal - normal exam             Anesthesia Plan    ASA 2     spinal and epidural     The patient is not a current smoker.    Anesthetic plan and risks " discussed with patient.  Use of blood products discussed with patient who consented to blood products.    Plan discussed with CRNA.

## 2023-12-21 NOTE — H&P
Obstetrical Admission History and Physical     Margot Nichols is a 22 y.o.  at 37w0d. YANA: 1/10/2024, by Ultrasound. Estimated fetal weight: 8lb9oz. She has had prenatal care with Dr. Maurer .    Chief Complaint: Scheduled Induction    Assessment/Plan    Admit for IOL w/cytotec then pitocin, arom prn, epidural prn  Pt aware of risk LGA infant, including but not limited to shoulder dystocia, labor dystocia, transient or permanent neurologic damage for infant, worsened tearing/lacerations.    Principal Problem:    Gestational hypertension, third trimester  Active Problems:    Obesity affecting pregnancy, antepartum    Polyhydramnios in third trimester    Excessive fetal growth affecting management of pregnancy in third trimester      Pregnancy Problems (from 10/24/23 to present)       No problems associated with this episode.          Options for delivery have been discussed with the patient and she elects for an induction of labor.  Cervical ripening with cytotec, cervidil, other prostaglandin agents has been discussed.  Induction of labor with pitocin, amniotomy, cytotec, and cervical balloon have been discussed in detail. The risks, benefits, complications, alternatives, expected outcomes, potential problems during recuperation and recovery, and the risks of not performing the procedure were discussed with the patient. The patient stated understanding that the risks of delivery include, but are not limited to: death; reaction to medications; injury to bowel, bladder, ureters, uterus, cervix, vagina, and other pelvic and abdominal structures, infection; blood loss and possible need for transfusion; and potential need for surgery, including hysterectomy. The risks of injury to the infant during delivery were also discussed. All questions were answered. There was concurrence with the planned procedure, and the patient wanted to proceed.    Admit to inpatient status. I anticipate that this patient will require a  stay exceeding at least 2 midnights for delivery and postpartum.  Induction of labor.  Management of pregnancy complications, as indicated.    Subjective   Good fetal movement. Denies vaginal bleeding., Denies contractions., Denies leaking of fluid.       Reason for Induction of Labor:  GHTN         Obstetrical History   OB History    Para Term  AB Living   2 1 1   0 1   SAB IAB Ectopic Multiple Live Births   0 0 0 0 1      # Outcome Date GA Lbr Vikas/2nd Weight Sex Delivery Anes PTL Lv   2 Current            1 Term    3317 g M Vag-Spont   YOSHI       Past Medical History  Past Medical History:   Diagnosis Date    Other conditions influencing health status     Full-term infant    Personal history of other diseases of the digestive system 12/15/2014    History of gastroesophageal reflux (GERD)    Personal history of other specified conditions 12/15/2014    History of vomiting        Past Surgical History   Past Surgical History:   Procedure Laterality Date    MYRINGOTOMY W/ TUBES  2014    Myringotomy - With Ventilating Tube Insertion    OTHER SURGICAL HISTORY  2021    Endoscopy       Social History  Social History     Tobacco Use    Smoking status: Never    Smokeless tobacco: Never   Substance Use Topics    Alcohol use: Never     Substance and Sexual Activity   Drug Use Never       Allergies  Patient has no known allergies.     Medications  Medications Prior to Admission   Medication Sig Dispense Refill Last Dose    buPROPion XL (Wellbutrin XL) 300 mg 24 hr tablet Take 1 tablet (300 mg) by mouth once daily in the morning.   2023 at 1000    cetirizine (ZyrTEC) 10 mg tablet Take 1 tablet (10 mg) by mouth once daily.   2023 at 1000    pantoprazole (ProtoNix) 40 mg EC tablet TAKE 1 TABLET BY MOUTH EVERY DAY 90 tablet 0 2023 at 1000    prenatal vit 91/iron/folic/dha (PRENATAL + DHA ORAL) Prenatal + DHA   2023 at 1000       Objective    Last Vitals  Temp Pulse Resp BP MAP O2  Sat   36.8 °C (98.2 °F) (!) 121 18 121/66   99 %     Physical Examination  GENERAL: Examination reveals a well developed, well nourished, gravid female in no acute distress. She is alert and cooperative.  FHR is cat I  EXTREMITIES: no redness or tenderness in the calves or thighs, no edema    Lab Review  Labs in chart were reviewed.

## 2023-12-22 PROCEDURE — 1220000001 HC OB SEMI-PRIVATE ROOM DAILY

## 2023-12-22 PROCEDURE — 2500000001 HC RX 250 WO HCPCS SELF ADMINISTERED DRUGS (ALT 637 FOR MEDICARE OP): Performed by: OBSTETRICS & GYNECOLOGY

## 2023-12-22 RX ADMIN — ACETAMINOPHEN 975 MG: 325 TABLET ORAL at 21:43

## 2023-12-22 RX ADMIN — IBUPROFEN 600 MG: 600 TABLET, FILM COATED ORAL at 15:28

## 2023-12-22 RX ADMIN — ACETAMINOPHEN 975 MG: 325 TABLET ORAL at 03:54

## 2023-12-22 RX ADMIN — IBUPROFEN 600 MG: 600 TABLET, FILM COATED ORAL at 09:40

## 2023-12-22 RX ADMIN — IBUPROFEN 600 MG: 600 TABLET, FILM COATED ORAL at 03:53

## 2023-12-22 RX ADMIN — ACETAMINOPHEN 975 MG: 325 TABLET ORAL at 15:28

## 2023-12-22 RX ADMIN — IBUPROFEN 600 MG: 600 TABLET, FILM COATED ORAL at 21:43

## 2023-12-22 RX ADMIN — ACETAMINOPHEN 975 MG: 325 TABLET ORAL at 09:40

## 2023-12-22 ASSESSMENT — PAIN SCALES - GENERAL
PAINLEVEL_OUTOF10: 2
PAINLEVEL_OUTOF10: 0 - NO PAIN
PAINLEVEL_OUTOF10: 0 - NO PAIN
PAINLEVEL_OUTOF10: 2
PAIN_LEVEL: 0
PAINLEVEL_OUTOF10: 2

## 2023-12-22 NOTE — LACTATION NOTE
Lactation Consultant Note  Lactation Consultation  Reason for Consult: Initial assessment  Consultant Name: Erica Zambrano    Maternal Information  Has mother  before?: Yes  How long did the mother previously breastfeed?: a few days, then pumped and became engorged. reviewed supply and demand  Previous Maternal Breastfeeding Challenges: Low milk supply, Difficult latch  Infant to breast within first 2 hours of birth?: Yes  Exclusive Pump and Bottle Feed: No    Maternal Assessment  Breast Assessment: Medium, Wide space, No breast changes observed in pregnancy, Other (Comment) (mother states had increased size after her milk increased with her first child. He did not latch well, so she pumped and gave formula at night. she states her supply did drop then.)  Nipple Assessment: Intact, Short, Rounded after feeding, Other (Comment) (large diameter)  Areola Assessment: Normal    Infant Assessment  Infant Behavior: Active alert, Crying, Feeding cues observed, Rooting response, Suckles on and off, needs stimulation, Awake  Infant Assessment: Palate - high/arch/bubble/normal, Good cupping of tongue, Tongue protrudes over alveolar ridge    Feeding Assessment  Nutrition Source: Breastmilk, Formula (per mother’s request)  Feeding Method: Nursing at the breast, Paced bottle  Feeding Position: Baby led, Cross - cradle, Mother demonstrates good positioning, Chin tucked into breast, Both sides, Nipple to nose, Skin to skin  Suck/Feeding: Sustained, Tactile stimulation needed, Audible swallowing  Latch Assessment: Minimal assistance is needed, Flanged lips, Comfortable latch, Instructed on deep latch, Bursts of sucking, swallowing, and rest    LATCH TOOL  Latch: Grasps breast, tongue down, lips flanged, rhythmic sucking  Audible Swallowing: A few with stimulation  Type of Nipple: Everted (After stimulation)  Comfort (Breast/Nipple): Soft/non-tender  Hold (Positioning): Minimal assist, teach one side, mother does other, staff  holds  LATCH Score: 8    Breast Pump  Pump: Individual user electric pump, Hand expression  Frequency: Other (comment) (has not pumped yet; encouraged to add pumping if she is going to continue to offer formula with feeds)    Other OB Lactation Tools       Patient Follow-up  Outpatient Lactation Follow-up: Recommended    Other OB Lactation Documentation  Maternal Risk Factors:  delivery, Hypothyroidism  Infant Risk Factors: Early term birth 37-39 weeks    Recommendations/Summary  This  mother states her first child had a tongue tie which caused her to have nipple damage; she ended up pumping during the day and offering formula at night. Her supply eventually decreased and she changed to formula. She would like to breastfeed this infant; she is showing feeding cues at this time. Reviewed late  infant status with possible poor feeds/poor milk removal; infant latched onto the R breast(after L attempt for a few minutes;) and fed for 12 minutes on the R breast. Still showing feeding cues; mother moved her to the L breast and infant latched again. She has a moderately deep latch, active suck/swallow; occasionally she comes off the breast but immediately wants to latch again. Mother has been offering formula as she is worried about her supply; we reviewed supply and demand and that infant needs to always breastfeed first prior to formula supplement. If mother is to continue with supplementation then she is encouraged to pump with all feeds(if supplementing all feeds). Mother concerned about how much she is making. Reviewed normal process of 3-5 days for full milk supply, milk removal, skin to skin, voids/stools, weight loss. Reviewed engorgement/signs/symptoms/treatment, mastitis/signs/symptoms/treatment. Encouraged to consider outpatient support group as needed or outpatient Lactation located at [facility]. Mother has several pumps at home; she will return to work in a couple of months. Ongoing support  offered per REYES.

## 2023-12-22 NOTE — PROGRESS NOTES
Postpartum Progress Note    Assessment/Plan   Margot Nichols is a 23 y.o., , who delivered at 39w6d gestation and is now postpartum day 1.        Principal Problem:    Gestational hypertension, third trimester  Active Problems:    Obesity affecting pregnancy, antepartum    Polyhydramnios in third trimester    Excessive fetal growth affecting management of pregnancy in third trimester      Pregnancy Problems (from 23 to present)       Problem Noted Resolved    Encounter for elective induction of labor 10/5/2023 by Gómez Wood MD No    Priority:  Medium            Hospital course: no complications  Vaginal Birth  Patient is currently breastfeedingThe patient's blood type is B POS.     Subjective   Her pain is well controlled with current medications  She is passing flatus  She is ambulating well  She is tolerating a Adult diet Regular  She reports no breast or nursing problems  She denies emotional concerns today   Her plan for contraception is none         Objective   Allergies:   Patient has no known allergies.         Last Vitals:  Temp Pulse Resp BP MAP Pulse Ox   36.7 °C (98.1 °F) 95 17 123/71   100 %     Vitals Min/Max Last 24 Hours:  Temp  Min: 36.4 °C (97.5 °F)  Max: 37.7 °C (99.9 °F)  Pulse  Min: 74  Max: 168  Resp  Min: 16  Max: 20  BP  Min: 99/55  Max: 129/58    Intake/Output:     Intake/Output Summary (Last 24 hours) at 2023 1143  Last data filed at 2023 0300  Gross per 24 hour   Intake 516.67 ml   Output 2206 ml   Net -1689.33 ml       Physical Exam:  Appears well, NAD  Fundus firm  Perineum intact  Lochia light  Extremities NT    Lab Data:  Labs in chart were reviewed.

## 2023-12-22 NOTE — DISCHARGE INSTRUCTIONS
"After Your Delivery Discharge Instructions    After Discharge Orders:  Follow up in 6 weeks          After your delivery - signs and symptoms to watch for:  Fever - Oral temperature greater than 100.4 degrees Fahrenheit  Foul-smelling vaginal discharge  Headache unrelieved by \"pain meds\"  Difficulty urinating  Breasts reddened, hard, hot to the touch  Nipple discharge which is foul-smelling or contains pus  Increased pain at the site of the {incision/lacerations/episiotomy:44727}  Difficulty breathing with or without chest pain  New calf pain especially if only on one side  Sudden, continuing increased vaginal bleeding with or without clots  Unrelieved feelings of:  Inability to cope  Sadness  Anxiety  Lack of interest in baby  Insomnia  Crying     What to do at home:  See patient education handouts for full information  Resume activity gradually   Don't lift anything heavier than baby and carrier until OK'd by your Physician or Midwife  No sex until OK'd by your Physician or Midwife  Take care of yourself by sleeping/resting as much as possible  Eat regular nutritious meals  Let someone else care for you, your baby, and housework as much as possible   Take pain medication as prescribed whenever you need them  Wear compression stockings if prescribed   To avoid/relieve constipation take stool softeners if advised   Drink lots of water/fruit juices  Increase fiber in your diet       Refer to [facility] Discharge Instructions for problems or follow-up regarding  Feeding/nursing   "

## 2023-12-22 NOTE — DISCHARGE SUMMARY
Discharge Summary    Admission Date: 12/20/2023  Discharge Date: 12/22/2023    Discharge Diagnosis  Gestational hypertension, third trimester    Hospital Course  Delivery Date: 12/21/2023  6:50 PM   Delivery type: Vaginal, Spontaneous    GA at delivery: 37w1d  Outcome: Living   Anesthesia during delivery: Epidural   Intrapartum complications: None   Feeding method: Breastfeeding Status: Yes     Procedures: none  Contraception at discharge: none      Pertinent Physical Exam At Time of Discharge    normal    Discharge Meds     Your medication list        CONTINUE taking these medications        Instructions Last Dose Given Next Dose Due   buPROPion  mg 24 hr tablet  Commonly known as: Wellbutrin XL           pantoprazole 40 mg EC tablet  Commonly known as: ProtoNix      TAKE 1 TABLET BY MOUTH EVERY DAY       PRENATAL + DHA ORAL           ZyrTEC 10 mg tablet  Generic drug: cetirizine                     Complications Requiring Follow-Up  none  Test Results Pending At Discharge  Pending Labs       No current pending labs.            Outpatient Follow-Up  Future Appointments   Date Time Provider Department Center   12/26/2023  8:45 AM MD Yahaira Lund   1/3/2024  8:45 AM MD Yahaira Lindsay   1/8/2024  8:45 AM MD Yahaira Lindsay       I spent  15 minutes in the professional and overall care of this patient.      Sheri Maurer MD

## 2023-12-22 NOTE — LACTATION NOTE
Lactation Consultant Note  Lactation Consultation  Reason for Consult: Initial assessment  Consultant Name: Erica Zambrano    Maternal Information  Has mother  before?: Yes  How long did the mother previously breastfeed?: She attempted but was not successful.  Previous Maternal Breastfeeding Challenges: Difficult latch  Infant to breast within first 2 hours of birth?: Yes  Exclusive Pump and Bottle Feed: Yes    Maternal Assessment  Breast Assessment: Other (Comment) (no assessment this visit but mother states she has no issues; is hand pumping for now and was hand pumping at home for a short time before delivery)  Nipple Assessment: Intact, Short, Rounded after feeding, Other (Comment) (large diameter)  Areola Assessment: Normal    Infant Assessment  Infant Behavior: Active alert, Crying, Feeding cues observed, Rooting response, Suckles on and off, needs stimulation, Awake  Infant Assessment: Palate - high/arch/bubble/normal, Good cupping of tongue, Tongue protrudes over alveolar ridge    Feeding Assessment  Nutrition Source: Breastmilk, Formula (per mother’s request)  Feeding Method: Feeding expressed breastmilk, Paced bottle  Feeding Position: Baby led, Cross - cradle, Mother demonstrates good positioning, Chin tucked into breast, Both sides, Nipple to nose, Skin to skin  Suck/Feeding: Sustained, Tactile stimulation needed, Audible swallowing  Latch Assessment: Minimal assistance is needed, Flanged lips, Comfortable latch, Instructed on deep latch, Bursts of sucking, swallowing, and rest    LATCH TOOL  Latch: Grasps breast, tongue down, lips flanged, rhythmic sucking  Audible Swallowing: A few with stimulation  Type of Nipple: Everted (After stimulation)  Comfort (Breast/Nipple): Soft/non-tender  Hold (Positioning): Minimal assist, teach one side, mother does other, staff holds  LATCH Score: 8    Breast Pump  Pump: Manual pump, Hand expression  Frequency: 5-7 times per day  Breast Shield Size and Type: 24  mm  Units of Volume: mL per session    Other OB Lactation Tools  Lactation Tools: Flanges    Patient Follow-up  Outpatient Lactation Follow-up: Recommended    Other OB Lactation Documentation  Maternal Risk Factors:  delivery, Hypothyroidism  Infant Risk Factors: Prematurity <37 weeks    Recommendations/Summary  This  mother states that she plans to pump and feed her baby pumped breast milk. She is currently using a manual pump and did pump out 5-10 ml with her first couple pumpings. She states she is now not getting much at all; we reviewed that this is normal. Encouraged her to pump/hand express her colostrum around the clock at least 8x/24 hours; meanwhile she is supplementing with formula for most feeds. Infant had an episode where she turned purple per parents; she was taught to use paced bottle feeding for now and will definitely be staying until tomorrow. Parents feel comfortable with the paced feeding and will continue to use formula until she can increase her milk supply. Encouraged to use hand expression/pumping and to consider the use of the electric pump for the ability to save time. Encouraged skin to skin, and reviewed mastitis/signs/symptoms/treatment, engorgement/signs/symptoms/treatment. Encouraged to attend outpatient support group as possible each Tuesday and gave information on second night/cluster feeding behavior, outpatient lactation at [facility] and encouraged to call them as needed. Mother does have an electric pump at home and will be returning to work. Ongoing support offered per REYES.

## 2023-12-22 NOTE — ANESTHESIA POSTPROCEDURE EVALUATION
Patient: Margot Nichols    Procedure Summary       Date: 12/21/23 Room / Location:     Anesthesia Start: 1100 Anesthesia Stop: 1850    Procedure: Labor Analgesia Diagnosis:     Scheduled Providers:  Responsible Provider: NATHALIA Damon    Anesthesia Type: spinal, epidural ASA Status: 2            Anesthesia Type: spinal, epidural    Vitals Value Taken Time   BP  12/22/23 1127   Temp  12/22/23 1127   Pulse  12/22/23 1127   Resp  12/22/23 1127   SpO2 = 12/22/23 1127       Anesthesia Post Evaluation    Patient location during evaluation: bedside  Patient participation: complete - patient participated  Level of consciousness: awake and alert  Pain score: 0  Pain management: adequate  Multimodal analgesia pain management approach  Airway patency: patent  Two or more strategies used to mitigate risk of obstructive sleep apnea  Cardiovascular status: acceptable  Respiratory status: acceptable  Hydration status: acceptable  Postoperative Nausea and Vomiting: none        No notable events documented.

## 2023-12-22 NOTE — NURSING NOTE
RN has been continuously at bedside throughout delivery process. Epidural dressing noted to not be adhered to patients back after delivery at 1900, Jason NELSON from anesthesia notified that dressing was found to be detaching, and that catheter was then removed by this RN due to concern that reinforcement would be inappropriate due to amount of dressing detached. No instructions were given at that time.

## 2023-12-22 NOTE — L&D DELIVERY NOTE
OB Delivery Note  2023  Margot Nichols  23 y.o.   Vaginal, Spontaneous        Gestational Age: 37w1d  /Para:   Quantitative Blood Loss: Admission to Discharge: 506 mL (2023  7:04 PM - 2023 10:45 AM)    Dee Nichols [[id number]]      Labor Events    Rupture date/time: 2023 0930  Rupture type: Artificial  Fluid color: Clear  Labor type: Induced Onset of Labor  Labor allowed to proceed with plans for an attempted vaginal birth?: Yes  Induction: Oxytocin, AROM  Induction date/time: 2023  Induction indications: Hypertensive Disorder of Pregnancy  Complications: None       Labor Event Times    Labor onset date/time: 2023  Dilation complete date/time: 2023  Start pushing date/time: 2023       Labor Length    1st stage: 16h 52m  2nd stage: 1h 13m  3rd stage: 0h 03m       Placenta    Placenta delivery date/time: 2023  Placenta removal: Spontaneous  Placenta disposition: lab       Cord    Vessels: 3 vessels  Complications: Nuchal  Nuchal intervention: reduced  Nuchal cord description: loose nuchal cord  Delayed cord clamping?: Yes  Cord clamped date/time: 2023  Cord blood disposition: Lab  Gases sent?: No       Lacerations    Episiotomy: None       Anesthesia    Method: Epidural       Operative Delivery    Forceps attempted?: No  Vacuum extractor attempted?: No       Shoulder Dystocia    Shoulder dystocia present?: No       [place] Delivery    Time head delivered: 2023 18:50:00  Birth date/time: 2025 18:50:00  Delivery type: Vaginal, Spontaneous  Complications: None       Resuscitation    Method: Suctioning, Tactile stimulation       Apgars    Living status: Living  Apgar Component Scores:  1 min.:  5 min.:  10 min.:  15 min.:  20 min.:    Skin color:  0  1  1      Heart rate:  2  2  2      Reflex irritability:  0  2  2      Muscle tone:  0  2  2      Respiratory effort:  1  2  2      Total:  3  9  9       Apgars assigned by: 5 & 10 MIN BY RENE BANDA.       Delivery Providers    Delivering clinician: Sheri Maurer MD   Provider Role    Martha Green, RN Delivery Nurse    Sheeba Granados, RN Nursery Nurse     Resident    SOLO Zazueta-CNP Nurse Practitioner                 Sheri Maurer MD

## 2023-12-23 VITALS
OXYGEN SATURATION: 99 % | HEART RATE: 99 BPM | WEIGHT: 279 LBS | RESPIRATION RATE: 18 BRPM | TEMPERATURE: 97.9 F | SYSTOLIC BLOOD PRESSURE: 113 MMHG | DIASTOLIC BLOOD PRESSURE: 76 MMHG | HEIGHT: 66 IN | BODY MASS INDEX: 44.84 KG/M2

## 2023-12-23 PROBLEM — O13.3 GESTATIONAL HYPERTENSION, THIRD TRIMESTER (HHS-HCC): Status: RESOLVED | Noted: 2023-09-20 | Resolved: 2023-12-23

## 2023-12-23 PROBLEM — O36.63X0 EXCESSIVE FETAL GROWTH AFFECTING MANAGEMENT OF PREGNANCY IN THIRD TRIMESTER (HHS-HCC): Status: RESOLVED | Noted: 2023-12-20 | Resolved: 2023-12-23

## 2023-12-23 PROBLEM — O40.3XX0 POLYHYDRAMNIOS IN THIRD TRIMESTER (HHS-HCC): Status: RESOLVED | Noted: 2023-12-20 | Resolved: 2023-12-23

## 2023-12-23 PROBLEM — O99.210 OBESITY AFFECTING PREGNANCY, ANTEPARTUM (HHS-HCC): Status: RESOLVED | Noted: 2023-09-20 | Resolved: 2023-12-23

## 2023-12-23 PROCEDURE — 2500000001 HC RX 250 WO HCPCS SELF ADMINISTERED DRUGS (ALT 637 FOR MEDICARE OP): Performed by: OBSTETRICS & GYNECOLOGY

## 2023-12-23 RX ORDER — IBUPROFEN 600 MG/1
600 TABLET ORAL 3 TIMES DAILY PRN
Qty: 90 TABLET | Refills: 0 | Status: SHIPPED | OUTPATIENT
Start: 2023-12-23 | End: 2024-01-22

## 2023-12-23 RX ADMIN — ACETAMINOPHEN 975 MG: 325 TABLET ORAL at 03:04

## 2023-12-23 RX ADMIN — IBUPROFEN 600 MG: 600 TABLET, FILM COATED ORAL at 09:21

## 2023-12-23 RX ADMIN — ACETAMINOPHEN 975 MG: 325 TABLET ORAL at 09:21

## 2023-12-23 RX ADMIN — IBUPROFEN 600 MG: 600 TABLET, FILM COATED ORAL at 03:03

## 2023-12-23 ASSESSMENT — PAIN SCALES - GENERAL
PAINLEVEL_OUTOF10: 0 - NO PAIN

## 2023-12-23 NOTE — DISCHARGE SUMMARY
Discharge Summary    Admission Date: 12/20/2023  Discharge Date: 12/23/23    Discharge Diagnosis  Gestational hypertension, third trimester    Hospital Course  Delivery Date: 12/21/2023  6:50 PM   Delivery type: Vaginal, Spontaneous    GA at delivery: 37w1d  Outcome: Living   Anesthesia during delivery: Epidural   Intrapartum complications: None   Feeding method: Breastfeeding Status: Yes     Procedures:  vaginal delivery  Contraception at discharge: Abstinence for 6 weeks    Pertinent Physical Exam At Time of Discharge  General: Examination reveals a well developed, well nourished and obese, female, in no acute distress. She is alert and cooperative.  HEENT: NC/AT  Abdomen: soft, nontender, nondistended  Fundus: Firm, NT, involuting well.  Perineum: Healing, intact  Extremities: Trace pitting edema BLE  Psych: appropiate affect    Discharge Meds     Your medication list        START taking these medications        Instructions Last Dose Given Next Dose Due   ibuprofen 600 mg tablet      Take 1 tablet (600 mg) by mouth 3 times a day as needed for mild pain (1 - 3).              CONTINUE taking these medications        Instructions Last Dose Given Next Dose Due   buPROPion  mg 24 hr tablet  Commonly known as: Wellbutrin XL           pantoprazole 40 mg EC tablet  Commonly known as: ProtoNix      TAKE 1 TABLET BY MOUTH EVERY DAY       PRENATAL + DHA ORAL           ZyrTEC 10 mg tablet  Generic drug: cetirizine                     Where to Get Your Medications        These medications were sent to [id number] - [address] AT [facility] [address] [address]      Phone: [phone]   ibuprofen 600 mg tablet         Test Results Pending At Discharge  Pending Labs       No current pending labs.            Outpatient Follow-Up  Future Appointments   Date Time Provider Department Center   1/3/2024  8:45 AM Sheri Maurer MD [facility]   1/8/2024  8:45  AM MD Yahaira Lnidsay, DO

## 2023-12-26 ENCOUNTER — APPOINTMENT (OUTPATIENT)
Dept: OBSTETRICS AND GYNECOLOGY | Facility: CLINIC | Age: 23
End: 2023-12-26
Payer: COMMERCIAL

## 2024-01-03 ENCOUNTER — APPOINTMENT (OUTPATIENT)
Dept: OBSTETRICS AND GYNECOLOGY | Facility: CLINIC | Age: 24
End: 2024-01-03
Payer: COMMERCIAL

## 2024-01-08 ENCOUNTER — APPOINTMENT (OUTPATIENT)
Dept: OBSTETRICS AND GYNECOLOGY | Facility: CLINIC | Age: 24
End: 2024-01-08
Payer: COMMERCIAL

## 2024-06-12 NOTE — PROGRESS NOTES
Margot Nichols 23 y.o. for complaint of prolapse  Pelvic prolapse:    Duration-several months  Pressure-intermittent  Urinary incontinence-none  Fecal incontinence-none  Vaginal discharge-none  Pain with intercourse.    Patient also complains of low libido x 4 years.  Talked with her pych and referred to us.     Physical Exam    Constitutional:       General: She is not in acute distress.     Appearance: Normal appearance.   Genitourinary:      Bladder and urethral meatus normal.      Vulva exam comments: normal.      No vaginal discharge.      Vaginal prolapse present.     No vaginal atrophy present.       Right Adnexa: no mass present.     Left Adnexa: no mass present.     No cervical lesion.      Uterus is not enlarged.   Cystocele-first degree  Rectocele-first degree           Assessment/Plan   Diagnoses and all orders for this visit:  Cystocele, midline  Discussed options.  Will try kegel exercises.  Call if symptoms worsen.  Low libido    Rectocele          Follow up:

## 2024-06-13 ENCOUNTER — LAB (OUTPATIENT)
Dept: LAB | Facility: LAB | Age: 24
End: 2024-06-13
Payer: COMMERCIAL

## 2024-06-13 ENCOUNTER — OFFICE VISIT (OUTPATIENT)
Dept: OBSTETRICS AND GYNECOLOGY | Facility: CLINIC | Age: 24
End: 2024-06-13
Payer: COMMERCIAL

## 2024-06-13 VITALS
WEIGHT: 245 LBS | HEIGHT: 66 IN | BODY MASS INDEX: 39.37 KG/M2 | DIASTOLIC BLOOD PRESSURE: 72 MMHG | SYSTOLIC BLOOD PRESSURE: 124 MMHG

## 2024-06-13 DIAGNOSIS — R68.82 LOW LIBIDO: ICD-10-CM

## 2024-06-13 DIAGNOSIS — N81.11 CYSTOCELE, MIDLINE: Primary | ICD-10-CM

## 2024-06-13 DIAGNOSIS — N81.6 RECTOCELE: ICD-10-CM

## 2024-06-13 PROCEDURE — 84403 ASSAY OF TOTAL TESTOSTERONE: CPT

## 2024-06-13 PROCEDURE — 3078F DIAST BP <80 MM HG: CPT | Performed by: OBSTETRICS & GYNECOLOGY

## 2024-06-13 PROCEDURE — 1036F TOBACCO NON-USER: CPT | Performed by: OBSTETRICS & GYNECOLOGY

## 2024-06-13 PROCEDURE — 82670 ASSAY OF TOTAL ESTRADIOL: CPT

## 2024-06-13 PROCEDURE — 82627 DEHYDROEPIANDROSTERONE: CPT

## 2024-06-13 PROCEDURE — 99214 OFFICE O/P EST MOD 30 MIN: CPT | Performed by: OBSTETRICS & GYNECOLOGY

## 2024-06-13 PROCEDURE — 3074F SYST BP LT 130 MM HG: CPT | Performed by: OBSTETRICS & GYNECOLOGY

## 2024-06-13 ASSESSMENT — ENCOUNTER SYMPTOMS
DEPRESSION: 0
LOSS OF SENSATION IN FEET: 0
OCCASIONAL FEELINGS OF UNSTEADINESS: 0

## 2024-06-13 ASSESSMENT — LIFESTYLE VARIABLES
AUDIT-C TOTAL SCORE: 0
HOW OFTEN DO YOU HAVE SIX OR MORE DRINKS ON ONE OCCASION: NEVER
HOW MANY STANDARD DRINKS CONTAINING ALCOHOL DO YOU HAVE ON A TYPICAL DAY: PATIENT DOES NOT DRINK
HOW OFTEN DO YOU HAVE A DRINK CONTAINING ALCOHOL: NEVER
SKIP TO QUESTIONS 9-10: 1

## 2024-06-13 ASSESSMENT — PAIN SCALES - GENERAL: PAINLEVEL: 0-NO PAIN

## 2024-06-14 LAB
DHEA-S SERPL-MCNC: 239 UG/DL (ref 65–395)
ESTRADIOL SERPL-MCNC: 31 PG/ML
TESTOST SERPL-MCNC: <30 NG/DL (ref 0–70)